# Patient Record
Sex: FEMALE | Race: BLACK OR AFRICAN AMERICAN | NOT HISPANIC OR LATINO | Employment: FULL TIME | URBAN - METROPOLITAN AREA
[De-identification: names, ages, dates, MRNs, and addresses within clinical notes are randomized per-mention and may not be internally consistent; named-entity substitution may affect disease eponyms.]

---

## 2017-06-24 ENCOUNTER — HOSPITAL ENCOUNTER (OUTPATIENT)
Facility: HOSPITAL | Age: 37
Setting detail: OBSERVATION
Discharge: HOME | End: 2017-06-25
Attending: EMERGENCY MEDICINE | Admitting: HOSPITALIST
Payer: COMMERCIAL

## 2017-06-24 ENCOUNTER — APPOINTMENT (EMERGENCY)
Dept: RADIOLOGY | Facility: HOSPITAL | Age: 37
End: 2017-06-24
Payer: COMMERCIAL

## 2017-06-24 DIAGNOSIS — E05.90 HYPERTHYROIDISM: Primary | ICD-10-CM

## 2017-06-24 DIAGNOSIS — R00.0 SINUS TACHYCARDIA: ICD-10-CM

## 2017-06-24 PROBLEM — N39.0 URINARY TRACT INFECTION: Status: ACTIVE | Noted: 2017-06-24

## 2017-06-24 PROBLEM — R79.89 ELEVATED LFTS: Status: ACTIVE | Noted: 2017-06-24

## 2017-06-24 LAB
ALBUMIN SERPL BCP-MCNC: 3.5 G/DL (ref 3.5–5)
ALP SERPL-CCNC: 91 U/L (ref 46–116)
ALT SERPL W P-5'-P-CCNC: 79 U/L (ref 12–78)
ANION GAP SERPL CALCULATED.3IONS-SCNC: 10 MMOL/L (ref 4–13)
AST SERPL W P-5'-P-CCNC: 60 U/L (ref 5–45)
ATRIAL RATE: 143 BPM
BACTERIA UR QL AUTO: ABNORMAL /HPF
BASOPHILS # BLD AUTO: 0.01 THOUSANDS/ΜL (ref 0–0.1)
BASOPHILS NFR BLD AUTO: 0 % (ref 0–1)
BILIRUB SERPL-MCNC: 1.37 MG/DL (ref 0.2–1)
BILIRUB UR QL STRIP: ABNORMAL
BUN SERPL-MCNC: 14 MG/DL (ref 5–25)
CALCIUM SERPL-MCNC: 9.9 MG/DL (ref 8.3–10.1)
CHLORIDE SERPL-SCNC: 106 MMOL/L (ref 100–108)
CLARITY UR: ABNORMAL
CLARITY, POC: NORMAL
CO2 SERPL-SCNC: 24 MMOL/L (ref 21–32)
COLOR UR: ABNORMAL
COLOR, POC: NORMAL
CREAT SERPL-MCNC: 0.56 MG/DL (ref 0.6–1.3)
DEPRECATED D DIMER PPP: 574 NG/ML (FEU) (ref 0–424)
EOSINOPHIL # BLD AUTO: 0.05 THOUSAND/ΜL (ref 0–0.61)
EOSINOPHIL NFR BLD AUTO: 1 % (ref 0–6)
ERYTHROCYTE [DISTWIDTH] IN BLOOD BY AUTOMATED COUNT: 12.3 % (ref 11.6–15.1)
EXT PROTEIN, UA: 30
GFR SERPL CREATININE-BSD FRML MDRD: >60 ML/MIN/1.73SQ M
GLUCOSE SERPL-MCNC: 116 MG/DL (ref 65–140)
GLUCOSE UR STRIP-MCNC: NEGATIVE MG/DL
HCG UR QL: NEGATIVE
HCT VFR BLD AUTO: 40.5 % (ref 34.8–46.1)
HGB BLD-MCNC: 13.3 G/DL (ref 11.5–15.4)
HGB UR QL STRIP.AUTO: NEGATIVE
HYALINE CASTS #/AREA URNS LPF: ABNORMAL /LPF
KETONES UR STRIP-MCNC: NEGATIVE MG/DL
LEUKOCYTE ESTERASE UR QL STRIP: NEGATIVE
LYMPHOCYTES # BLD AUTO: 1.85 THOUSANDS/ΜL (ref 0.6–4.47)
LYMPHOCYTES NFR BLD AUTO: 43 % (ref 14–44)
MCH RBC QN AUTO: 28.6 PG (ref 26.8–34.3)
MCHC RBC AUTO-ENTMCNC: 32.8 G/DL (ref 31.4–37.4)
MCV RBC AUTO: 87 FL (ref 82–98)
MONOCYTES # BLD AUTO: 0.45 THOUSAND/ΜL (ref 0.17–1.22)
MONOCYTES NFR BLD AUTO: 10 % (ref 4–12)
NEUTROPHILS # BLD AUTO: 1.97 THOUSANDS/ΜL (ref 1.85–7.62)
NEUTS SEG NFR BLD AUTO: 46 % (ref 43–75)
NITRITE UR QL STRIP: NEGATIVE
NON-SQ EPI CELLS URNS QL MICRO: ABNORMAL /HPF
NRBC BLD AUTO-RTO: 0 /100 WBCS
P AXIS: 81 DEGREES
PH UR STRIP.AUTO: 5.5 [PH] (ref 4.5–8)
PLATELET # BLD AUTO: 207 THOUSANDS/UL (ref 149–390)
PMV BLD AUTO: 10.7 FL (ref 8.9–12.7)
POTASSIUM SERPL-SCNC: 3.9 MMOL/L (ref 3.5–5.3)
PR INTERVAL: 148 MS
PROT SERPL-MCNC: 7.8 G/DL (ref 6.4–8.2)
PROT UR STRIP-MCNC: ABNORMAL MG/DL
QRS AXIS: 62 DEGREES
QRSD INTERVAL: 64 MS
QT INTERVAL: 268 MS
QTC INTERVAL: 413 MS
RBC # BLD AUTO: 4.65 MILLION/UL (ref 3.81–5.12)
RBC #/AREA URNS AUTO: ABNORMAL /HPF
SODIUM SERPL-SCNC: 140 MMOL/L (ref 136–145)
SP GR UR STRIP.AUTO: >=1.03 (ref 1–1.03)
SPECIMEN SOURCE: NORMAL
T WAVE AXIS: 61 DEGREES
T3FREE SERPL-MCNC: >30 PG/ML (ref 2.3–4.2)
T4 FREE SERPL-MCNC: >8 NG/DL (ref 0.76–1.46)
TROPONIN I BLD-MCNC: 0.01 NG/ML (ref 0–0.08)
TSH SERPL DL<=0.05 MIU/L-ACNC: <0.007 UIU/ML (ref 0.36–3.74)
UROBILINOGEN UR QL STRIP.AUTO: 1 E.U./DL
VENTRICULAR RATE: 143 BPM
WBC # BLD AUTO: 4.33 THOUSAND/UL (ref 4.31–10.16)
WBC #/AREA URNS AUTO: ABNORMAL /HPF

## 2017-06-24 PROCEDURE — 87086 URINE CULTURE/COLONY COUNT: CPT

## 2017-06-24 PROCEDURE — 96361 HYDRATE IV INFUSION ADD-ON: CPT

## 2017-06-24 PROCEDURE — 84439 ASSAY OF FREE THYROXINE: CPT | Performed by: EMERGENCY MEDICINE

## 2017-06-24 PROCEDURE — 80053 COMPREHEN METABOLIC PANEL: CPT | Performed by: EMERGENCY MEDICINE

## 2017-06-24 PROCEDURE — 84443 ASSAY THYROID STIM HORMONE: CPT | Performed by: EMERGENCY MEDICINE

## 2017-06-24 PROCEDURE — 93005 ELECTROCARDIOGRAM TRACING: CPT

## 2017-06-24 PROCEDURE — 80076 HEPATIC FUNCTION PANEL: CPT | Performed by: PHYSICIAN ASSISTANT

## 2017-06-24 PROCEDURE — 84484 ASSAY OF TROPONIN QUANT: CPT

## 2017-06-24 PROCEDURE — 85025 COMPLETE CBC W/AUTO DIFF WBC: CPT | Performed by: EMERGENCY MEDICINE

## 2017-06-24 PROCEDURE — 93005 ELECTROCARDIOGRAM TRACING: CPT | Performed by: EMERGENCY MEDICINE

## 2017-06-24 PROCEDURE — 81002 URINALYSIS NONAUTO W/O SCOPE: CPT | Performed by: EMERGENCY MEDICINE

## 2017-06-24 PROCEDURE — 36415 COLL VENOUS BLD VENIPUNCTURE: CPT | Performed by: EMERGENCY MEDICINE

## 2017-06-24 PROCEDURE — 85379 FIBRIN DEGRADATION QUANT: CPT | Performed by: EMERGENCY MEDICINE

## 2017-06-24 PROCEDURE — 96376 TX/PRO/DX INJ SAME DRUG ADON: CPT

## 2017-06-24 PROCEDURE — 99285 EMERGENCY DEPT VISIT HI MDM: CPT

## 2017-06-24 PROCEDURE — 81025 URINE PREGNANCY TEST: CPT | Performed by: EMERGENCY MEDICINE

## 2017-06-24 PROCEDURE — 81001 URINALYSIS AUTO W/SCOPE: CPT

## 2017-06-24 PROCEDURE — 71010 HB CHEST X-RAY 1 VIEW FRONTAL (PORTABLE): CPT

## 2017-06-24 PROCEDURE — 84481 FREE ASSAY (FT-3): CPT | Performed by: EMERGENCY MEDICINE

## 2017-06-24 PROCEDURE — 96374 THER/PROPH/DIAG INJ IV PUSH: CPT

## 2017-06-24 PROCEDURE — 87147 CULTURE TYPE IMMUNOLOGIC: CPT

## 2017-06-24 RX ORDER — ACETAMINOPHEN 325 MG/1
650 TABLET ORAL EVERY 6 HOURS PRN
Status: DISCONTINUED | OUTPATIENT
Start: 2017-06-24 | End: 2017-06-25 | Stop reason: HOSPADM

## 2017-06-24 RX ORDER — FERROUS SULFATE 325(65) MG
325 TABLET ORAL 2 TIMES DAILY
Status: DISCONTINUED | OUTPATIENT
Start: 2017-06-25 | End: 2017-06-25 | Stop reason: HOSPADM

## 2017-06-24 RX ORDER — METHIMAZOLE 10 MG/1
40 TABLET ORAL DAILY
Status: DISCONTINUED | OUTPATIENT
Start: 2017-06-24 | End: 2017-06-24 | Stop reason: SDUPTHER

## 2017-06-24 RX ORDER — DOCUSATE SODIUM 100 MG/1
100 CAPSULE, LIQUID FILLED ORAL 2 TIMES DAILY
Status: DISCONTINUED | OUTPATIENT
Start: 2017-06-25 | End: 2017-06-25 | Stop reason: HOSPADM

## 2017-06-24 RX ORDER — ADENOSINE 3 MG/ML
INJECTION, SOLUTION INTRAVENOUS
Status: DISPENSED
Start: 2017-06-24 | End: 2017-06-25

## 2017-06-24 RX ORDER — SENNOSIDES 8.6 MG
1 TABLET ORAL DAILY
Status: DISCONTINUED | OUTPATIENT
Start: 2017-06-25 | End: 2017-06-25 | Stop reason: HOSPADM

## 2017-06-24 RX ORDER — METHIMAZOLE 10 MG/1
40 TABLET ORAL EVERY 8 HOURS SCHEDULED
Status: DISCONTINUED | OUTPATIENT
Start: 2017-06-24 | End: 2017-06-24 | Stop reason: DRUGHIGH

## 2017-06-24 RX ORDER — PROPRANOLOL HYDROCHLORIDE 10 MG/1
10 TABLET ORAL EVERY 8 HOURS SCHEDULED
Status: DISCONTINUED | OUTPATIENT
Start: 2017-06-24 | End: 2017-06-25

## 2017-06-24 RX ORDER — ONDANSETRON 2 MG/ML
4 INJECTION INTRAMUSCULAR; INTRAVENOUS EVERY 6 HOURS PRN
Status: DISCONTINUED | OUTPATIENT
Start: 2017-06-24 | End: 2017-06-25 | Stop reason: HOSPADM

## 2017-06-24 RX ORDER — CALCIUM CARBONATE 500(1250)
1 TABLET ORAL 2 TIMES DAILY WITH MEALS
Status: DISCONTINUED | OUTPATIENT
Start: 2017-06-25 | End: 2017-06-25 | Stop reason: HOSPADM

## 2017-06-24 RX ORDER — METHIMAZOLE 10 MG/1
10 TABLET ORAL EVERY 8 HOURS SCHEDULED
Status: DISCONTINUED | OUTPATIENT
Start: 2017-06-24 | End: 2017-06-25 | Stop reason: DRUGHIGH

## 2017-06-24 RX ORDER — SODIUM CHLORIDE 9 MG/ML
100 INJECTION, SOLUTION INTRAVENOUS CONTINUOUS
Status: DISPENSED | OUTPATIENT
Start: 2017-06-24 | End: 2017-06-25

## 2017-06-24 RX ADMIN — SODIUM CHLORIDE 1000 ML: 0.9 INJECTION, SOLUTION INTRAVENOUS at 18:37

## 2017-06-24 RX ADMIN — SODIUM CHLORIDE 1000 ML: 0.9 INJECTION, SOLUTION INTRAVENOUS at 23:23

## 2017-06-24 RX ADMIN — SODIUM CHLORIDE 100 ML/HR: 0.9 INJECTION, SOLUTION INTRAVENOUS at 23:23

## 2017-06-24 RX ADMIN — CEFTRIAXONE 1000 MG: 1 INJECTION, POWDER, FOR SOLUTION INTRAMUSCULAR; INTRAVENOUS at 22:10

## 2017-06-24 RX ADMIN — METHIMAZOLE 40 MG: 10 TABLET ORAL at 21:37

## 2017-06-24 RX ADMIN — METOPROLOL TARTRATE 5 MG: 5 INJECTION INTRAVENOUS at 18:37

## 2017-06-24 RX ADMIN — METOPROLOL TARTRATE 5 MG: 5 INJECTION INTRAVENOUS at 19:14

## 2017-06-25 ENCOUNTER — APPOINTMENT (OUTPATIENT)
Dept: NON INVASIVE DIAGNOSTICS | Facility: HOSPITAL | Age: 37
End: 2017-06-25
Payer: COMMERCIAL

## 2017-06-25 ENCOUNTER — APPOINTMENT (OUTPATIENT)
Dept: RADIOLOGY | Facility: HOSPITAL | Age: 37
End: 2017-06-25
Payer: COMMERCIAL

## 2017-06-25 VITALS
WEIGHT: 152.12 LBS | HEIGHT: 60 IN | RESPIRATION RATE: 20 BRPM | SYSTOLIC BLOOD PRESSURE: 107 MMHG | TEMPERATURE: 98 F | DIASTOLIC BLOOD PRESSURE: 62 MMHG | OXYGEN SATURATION: 99 % | BODY MASS INDEX: 29.86 KG/M2 | HEART RATE: 64 BPM

## 2017-06-25 LAB
ALBUMIN SERPL BCP-MCNC: 2.6 G/DL (ref 3.5–5)
ALBUMIN SERPL BCP-MCNC: 3.6 G/DL (ref 3.5–5)
ALP SERPL-CCNC: 71 U/L (ref 46–116)
ALP SERPL-CCNC: 91 U/L (ref 46–116)
ALT SERPL W P-5'-P-CCNC: 85 U/L (ref 12–78)
ALT SERPL W P-5'-P-CCNC: 89 U/L (ref 12–78)
ANION GAP SERPL CALCULATED.3IONS-SCNC: 7 MMOL/L (ref 4–13)
AST SERPL W P-5'-P-CCNC: 64 U/L (ref 5–45)
AST SERPL W P-5'-P-CCNC: 64 U/L (ref 5–45)
BACTERIA UR CULT: NORMAL
BILIRUB DIRECT SERPL-MCNC: 0.35 MG/DL (ref 0–0.2)
BILIRUB DIRECT SERPL-MCNC: 0.38 MG/DL (ref 0–0.2)
BILIRUB SERPL-MCNC: 1.09 MG/DL (ref 0.2–1)
BILIRUB SERPL-MCNC: 1.42 MG/DL (ref 0.2–1)
BUN SERPL-MCNC: 9 MG/DL (ref 5–25)
CALCIUM SERPL-MCNC: 8.6 MG/DL (ref 8.3–10.1)
CHLORIDE SERPL-SCNC: 109 MMOL/L (ref 100–108)
CO2 SERPL-SCNC: 24 MMOL/L (ref 21–32)
CREAT SERPL-MCNC: 0.38 MG/DL (ref 0.6–1.3)
GFR SERPL CREATININE-BSD FRML MDRD: >60 ML/MIN/1.73SQ M
GLUCOSE SERPL-MCNC: 94 MG/DL (ref 65–140)
PLATELET # BLD AUTO: 161 THOUSANDS/UL (ref 149–390)
PMV BLD AUTO: 9.8 FL (ref 8.9–12.7)
POTASSIUM SERPL-SCNC: 4 MMOL/L (ref 3.5–5.3)
PROT SERPL-MCNC: 5.8 G/DL (ref 6.4–8.2)
PROT SERPL-MCNC: 7.6 G/DL (ref 6.4–8.2)
SODIUM SERPL-SCNC: 140 MMOL/L (ref 136–145)

## 2017-06-25 PROCEDURE — 84445 ASSAY OF TSI GLOBULIN: CPT | Performed by: PHYSICIAN ASSISTANT

## 2017-06-25 PROCEDURE — 80076 HEPATIC FUNCTION PANEL: CPT | Performed by: PHYSICIAN ASSISTANT

## 2017-06-25 PROCEDURE — 93306 TTE W/DOPPLER COMPLETE: CPT

## 2017-06-25 PROCEDURE — 85049 AUTOMATED PLATELET COUNT: CPT | Performed by: HOSPITALIST

## 2017-06-25 PROCEDURE — 76705 ECHO EXAM OF ABDOMEN: CPT

## 2017-06-25 PROCEDURE — 80048 BASIC METABOLIC PNL TOTAL CA: CPT | Performed by: HOSPITALIST

## 2017-06-25 RX ORDER — POTASSIUM IODIDE 1 G/ML
SOLUTION ORAL
Qty: 30 ML | Refills: 0 | Status: SHIPPED | OUTPATIENT
Start: 2017-06-25 | End: 2019-08-01 | Stop reason: HOSPADM

## 2017-06-25 RX ORDER — PROPRANOLOL HYDROCHLORIDE 10 MG/1
10 TABLET ORAL EVERY 8 HOURS SCHEDULED
Status: DISCONTINUED | OUTPATIENT
Start: 2017-06-25 | End: 2017-06-25

## 2017-06-25 RX ORDER — ATENOLOL 50 MG/1
50 TABLET ORAL ONCE
Status: COMPLETED | OUTPATIENT
Start: 2017-06-25 | End: 2017-06-25

## 2017-06-25 RX ORDER — METHIMAZOLE 10 MG/1
40 TABLET ORAL DAILY
Qty: 120 TABLET | Refills: 0 | Status: ON HOLD | OUTPATIENT
Start: 2017-06-25 | End: 2019-08-01 | Stop reason: SDUPTHER

## 2017-06-25 RX ORDER — TRAMADOL HYDROCHLORIDE 50 MG/1
50 TABLET ORAL EVERY 8 HOURS PRN
Status: DISCONTINUED | OUTPATIENT
Start: 2017-06-25 | End: 2017-06-25 | Stop reason: HOSPADM

## 2017-06-25 RX ORDER — METHIMAZOLE 10 MG/1
10 TABLET ORAL EVERY 8 HOURS SCHEDULED
Status: DISCONTINUED | OUTPATIENT
Start: 2017-06-25 | End: 2017-06-25 | Stop reason: HOSPADM

## 2017-06-25 RX ORDER — ATENOLOL 50 MG/1
50 TABLET ORAL 2 TIMES DAILY
Qty: 60 TABLET | Refills: 0 | Status: ON HOLD | OUTPATIENT
Start: 2017-06-25 | End: 2019-08-01 | Stop reason: SDUPTHER

## 2017-06-25 RX ADMIN — TRAMADOL HYDROCHLORIDE 50 MG: 50 TABLET, COATED ORAL at 02:43

## 2017-06-25 RX ADMIN — PROPRANOLOL HYDROCHLORIDE 10 MG: 10 TABLET ORAL at 00:17

## 2017-06-25 RX ADMIN — SENNOSIDES 8.6 MG: 8.6 TABLET, FILM COATED ORAL at 10:16

## 2017-06-25 RX ADMIN — Medication 1 TABLET: at 06:49

## 2017-06-25 RX ADMIN — Medication 325 MG: at 10:16

## 2017-06-25 RX ADMIN — ACETAMINOPHEN 650 MG: 325 TABLET, FILM COATED ORAL at 08:03

## 2017-06-25 RX ADMIN — PROPRANOLOL HYDROCHLORIDE 10 MG: 10 TABLET ORAL at 08:00

## 2017-06-25 RX ADMIN — ENOXAPARIN SODIUM 40 MG: 40 INJECTION SUBCUTANEOUS at 10:16

## 2017-06-25 RX ADMIN — METHIMAZOLE 10 MG: 10 TABLET ORAL at 06:49

## 2017-06-25 RX ADMIN — TRAMADOL HYDROCHLORIDE 50 MG: 50 TABLET, COATED ORAL at 15:37

## 2017-06-25 RX ADMIN — DOCUSATE SODIUM 100 MG: 100 CAPSULE, LIQUID FILLED ORAL at 10:16

## 2017-06-25 RX ADMIN — ATENOLOL 50 MG: 50 TABLET ORAL at 16:23

## 2017-07-05 LAB — TSI SER-ACNC: 568 % (ref 0–139)

## 2018-01-10 NOTE — MISCELLANEOUS
Message  telephone call from patient  C/O pain r/t hemorrhoid  States that tylenol is not working and wants something stronger  Pt  encouraged to try warm soaks, tucks, Preperation H  Also instructed to use stool softener      Active Problems    1  Encounter for supervision of other normal pregnancy in third trimester (V22 1) (Z34 83)   2  Hemorrhoids (455 6) (K64 9)   3  Pregnancy in third trimester with history of ectopic pregnancy (V23 42) (O09 13)    Current Meds   1  Colace 100 MG Oral Capsule; TAKE 1 CAPSULE TWICE DAILY AS NEEDED; Therapy: 52YSR0382 to [phone])  Requested for: 70QOJ0040; Last   Rx:[id number] Ordered   2  Ferrous Sulfate 325 (65 Fe) MG Oral Tablet; TAKE 1 TABLET TWICE DAILY WITH   MEALS; Therapy: 38RIQ8472 to Recorded   3  Prenatal Plus 27-1 MG Oral Tablet; Therapy: 30UIW9482 to Recorded   4  Vitamin C Plus 500 MG Oral Tablet; TAKE 1 TABLET DAILY; Therapy: 60TAH3915 to Recorded    Allergies    1  No Known Drug Allergies    2  No Known Environmental Allergies   3   No Known Food Allergies    Signatures   Electronically signed by : Nicolas Valdivia RN; Dec  7 2016 11:36AM EST                       (Author)

## 2018-01-14 NOTE — MISCELLANEOUS
Reason For Visit  Reason For Visit Free Text Note Form: PRE  ASSESSMENT  Case Management Documentation [facility]:   Information obtained from the patient and medical record  Patient's financial status employed  Patient is participating in [facility]  Action Plan: information provided  Progress Note  SW INTERN MET WITH 36YR OLD FEMALE, 4TH PREGNANCY, 37 WEEKS PREGNANT  AT HOME SHE HAS A 14, 12 AND 7YR OLD  PT IS , NO HX OF DV    NO HX OF D &A, MH OR SMOKING  PT QUIT SMOKING 7YRS AGO  PT IS PREPARED FOR BABY  PT WAS GIVEN PHONE NUMBER OF SCARLET, SHOULD SHE HAVE ADDITIONAL CONCERNS  WRITTEN BY EB [id number] [facility],[address]  Active Problems    1  Encounter for supervision of other normal pregnancy in third trimester (V22 1) (Z34 83)   2  Pregnancy in third trimester with history of ectopic pregnancy (V23 42) (O09 13)    Current Meds   1  Ferrous Sulfate 325 (65 Fe) MG Oral Tablet; TAKE 1 TABLET TWICE DAILY WITH MEALS; Therapy: [id number] to Recorded   2  Prenatal Plus 27-1 MG Oral Tablet; Therapy: 17QCK5316 to Recorded   3  Vitamin C Plus 500 MG Oral Tablet; TAKE 1 TABLET DAILY; Therapy: 76PXI6950 to Recorded    Allergies    1  No Known Drug Allergies    2  No Known Environmental Allergies   3   No Known Food Allergies    Plan    1  OB Global Urine Dip Non-Automated - POC; Status:Active - Perform Order; Requested   for:[id number];     Signatures   Electronically signed by : EB Abrams; 2016  4:03PM EST                       (Co-author)

## 2018-03-07 NOTE — PROGRESS NOTES
[employer] 3rd Trimester: The patient is planning on breastfeeding  The patient is planning on exclusively breastfeeding until the baby is 10months of age         Signatures   Electronically signed by : Adria Sorto RN; Nov 15 2016  5:32PM EST                       (Author)

## 2019-07-30 ENCOUNTER — APPOINTMENT (EMERGENCY)
Dept: RADIOLOGY | Facility: HOSPITAL | Age: 39
End: 2019-07-30
Payer: COMMERCIAL

## 2019-07-30 ENCOUNTER — HOSPITAL ENCOUNTER (OUTPATIENT)
Facility: HOSPITAL | Age: 39
Setting detail: OBSERVATION
Discharge: HOME | End: 2019-08-01
Attending: EMERGENCY MEDICINE | Admitting: INTERNAL MEDICINE
Payer: COMMERCIAL

## 2019-07-30 DIAGNOSIS — E05.90 HYPERTHYROIDISM: ICD-10-CM

## 2019-07-30 DIAGNOSIS — R00.0 SINUS TACHYCARDIA: ICD-10-CM

## 2019-07-30 DIAGNOSIS — E05.90 THYROTOXICOSIS: Primary | ICD-10-CM

## 2019-07-30 LAB
ALBUMIN SERPL BCP-MCNC: 3.6 G/DL (ref 3.5–5)
ALP SERPL-CCNC: 102 U/L (ref 46–116)
ALT SERPL W P-5'-P-CCNC: 51 U/L (ref 12–78)
ANION GAP SERPL CALCULATED.3IONS-SCNC: 11 MMOL/L (ref 4–13)
AST SERPL W P-5'-P-CCNC: 33 U/L (ref 5–45)
BASOPHILS # BLD AUTO: 0.02 THOUSANDS/ΜL (ref 0–0.1)
BASOPHILS NFR BLD AUTO: 0 % (ref 0–1)
BILIRUB SERPL-MCNC: 1.57 MG/DL (ref 0.2–1)
BUN SERPL-MCNC: 15 MG/DL (ref 5–25)
CALCIUM SERPL-MCNC: 9.4 MG/DL (ref 8.3–10.1)
CHLORIDE SERPL-SCNC: 105 MMOL/L (ref 100–108)
CO2 SERPL-SCNC: 22 MMOL/L (ref 21–32)
CREAT SERPL-MCNC: 0.72 MG/DL (ref 0.6–1.3)
EOSINOPHIL # BLD AUTO: 0.05 THOUSAND/ΜL (ref 0–0.61)
EOSINOPHIL NFR BLD AUTO: 1 % (ref 0–6)
ERYTHROCYTE [DISTWIDTH] IN BLOOD BY AUTOMATED COUNT: 12 % (ref 11.6–15.1)
GFR SERPL CREATININE-BSD FRML MDRD: 124 ML/MIN/1.73SQ M
GLUCOSE SERPL-MCNC: 105 MG/DL (ref 65–140)
HCT VFR BLD AUTO: 39.2 % (ref 34.8–46.1)
HGB BLD-MCNC: 12.3 G/DL (ref 11.5–15.4)
IMM GRANULOCYTES # BLD AUTO: 0.01 THOUSAND/UL (ref 0–0.2)
IMM GRANULOCYTES NFR BLD AUTO: 0 % (ref 0–2)
LYMPHOCYTES # BLD AUTO: 2.78 THOUSANDS/ΜL (ref 0.6–4.47)
LYMPHOCYTES NFR BLD AUTO: 43 % (ref 14–44)
MCH RBC QN AUTO: 27.8 PG (ref 26.8–34.3)
MCHC RBC AUTO-ENTMCNC: 31.4 G/DL (ref 31.4–37.4)
MCV RBC AUTO: 89 FL (ref 82–98)
MONOCYTES # BLD AUTO: 0.94 THOUSAND/ΜL (ref 0.17–1.22)
MONOCYTES NFR BLD AUTO: 14 % (ref 4–12)
NEUTROPHILS # BLD AUTO: 2.72 THOUSANDS/ΜL (ref 1.85–7.62)
NEUTS SEG NFR BLD AUTO: 42 % (ref 43–75)
NRBC BLD AUTO-RTO: 0 /100 WBCS
PLATELET # BLD AUTO: 228 THOUSANDS/UL (ref 149–390)
PMV BLD AUTO: 10.2 FL (ref 8.9–12.7)
POTASSIUM SERPL-SCNC: 3.8 MMOL/L (ref 3.5–5.3)
PROT SERPL-MCNC: 7.5 G/DL (ref 6.4–8.2)
RBC # BLD AUTO: 4.42 MILLION/UL (ref 3.81–5.12)
SODIUM SERPL-SCNC: 138 MMOL/L (ref 136–145)
TROPONIN I SERPL-MCNC: <0.02 NG/ML
TSH SERPL DL<=0.05 MIU/L-ACNC: <0.007 UIU/ML (ref 0.36–3.74)
WBC # BLD AUTO: 6.52 THOUSAND/UL (ref 4.31–10.16)

## 2019-07-30 PROCEDURE — 81025 URINE PREGNANCY TEST: CPT | Performed by: EMERGENCY MEDICINE

## 2019-07-30 PROCEDURE — 36415 COLL VENOUS BLD VENIPUNCTURE: CPT

## 2019-07-30 PROCEDURE — 80053 COMPREHEN METABOLIC PANEL: CPT | Performed by: EMERGENCY MEDICINE

## 2019-07-30 PROCEDURE — 81001 URINALYSIS AUTO W/SCOPE: CPT | Performed by: EMERGENCY MEDICINE

## 2019-07-30 PROCEDURE — 84484 ASSAY OF TROPONIN QUANT: CPT | Performed by: EMERGENCY MEDICINE

## 2019-07-30 PROCEDURE — 83880 ASSAY OF NATRIURETIC PEPTIDE: CPT

## 2019-07-30 PROCEDURE — 99284 EMERGENCY DEPT VISIT MOD MDM: CPT | Performed by: EMERGENCY MEDICINE

## 2019-07-30 PROCEDURE — 71046 X-RAY EXAM CHEST 2 VIEWS: CPT

## 2019-07-30 PROCEDURE — 84481 FREE ASSAY (FT-3): CPT | Performed by: INTERNAL MEDICINE

## 2019-07-30 PROCEDURE — 84443 ASSAY THYROID STIM HORMONE: CPT | Performed by: EMERGENCY MEDICINE

## 2019-07-30 PROCEDURE — 99285 EMERGENCY DEPT VISIT HI MDM: CPT

## 2019-07-30 PROCEDURE — 84439 ASSAY OF FREE THYROXINE: CPT

## 2019-07-30 PROCEDURE — 93005 ELECTROCARDIOGRAM TRACING: CPT

## 2019-07-30 PROCEDURE — 85025 COMPLETE CBC W/AUTO DIFF WBC: CPT | Performed by: EMERGENCY MEDICINE

## 2019-07-30 RX ORDER — METHIMAZOLE 10 MG/1
20 TABLET ORAL ONCE
Status: COMPLETED | OUTPATIENT
Start: 2019-07-30 | End: 2019-07-30

## 2019-07-30 RX ORDER — METHYLPREDNISOLONE SODIUM SUCCINATE 125 MG/2ML
125 INJECTION, POWDER, LYOPHILIZED, FOR SOLUTION INTRAMUSCULAR; INTRAVENOUS ONCE
Status: COMPLETED | OUTPATIENT
Start: 2019-07-30 | End: 2019-07-31

## 2019-07-30 RX ADMIN — METHIMAZOLE 20 MG: 10 TABLET ORAL at 23:59

## 2019-07-31 PROBLEM — E83.42 HYPOMAGNESEMIA: Status: ACTIVE | Noted: 2019-07-31

## 2019-07-31 PROBLEM — D72.819 LEUKOPENIA: Status: ACTIVE | Noted: 2019-07-31

## 2019-07-31 LAB
ANION GAP SERPL CALCULATED.3IONS-SCNC: 9 MMOL/L (ref 4–13)
ATRIAL RATE: 145 BPM
ATRIAL RATE: 150 BPM
ATRIAL RATE: 89 BPM
BACTERIA UR QL AUTO: ABNORMAL /HPF
BILIRUB UR QL STRIP: ABNORMAL
BUN SERPL-MCNC: 11 MG/DL (ref 5–25)
CALCIUM SERPL-MCNC: 8.3 MG/DL (ref 8.3–10.1)
CHLORIDE SERPL-SCNC: 109 MMOL/L (ref 100–108)
CLARITY UR: CLEAR
CO2 SERPL-SCNC: 19 MMOL/L (ref 21–32)
COLOR UR: ABNORMAL
CREAT SERPL-MCNC: 0.33 MG/DL (ref 0.6–1.3)
ERYTHROCYTE [DISTWIDTH] IN BLOOD BY AUTOMATED COUNT: 12 % (ref 11.6–15.1)
EXT PREG TEST URINE: NEGATIVE
EXT. CONTROL ED NAV: NORMAL
GFR SERPL CREATININE-BSD FRML MDRD: 164 ML/MIN/1.73SQ M
GLUCOSE SERPL-MCNC: 119 MG/DL (ref 65–140)
GLUCOSE UR STRIP-MCNC: NEGATIVE MG/DL
HCT VFR BLD AUTO: 31.8 % (ref 34.8–46.1)
HGB BLD-MCNC: 9.8 G/DL (ref 11.5–15.4)
HGB UR QL STRIP.AUTO: ABNORMAL
HYALINE CASTS #/AREA URNS LPF: ABNORMAL /LPF
KETONES UR STRIP-MCNC: ABNORMAL MG/DL
LEUKOCYTE ESTERASE UR QL STRIP: ABNORMAL
MAGNESIUM SERPL-MCNC: 1.5 MG/DL (ref 1.6–2.6)
MCH RBC QN AUTO: 27.7 PG (ref 26.8–34.3)
MCHC RBC AUTO-ENTMCNC: 30.8 G/DL (ref 31.4–37.4)
MCV RBC AUTO: 90 FL (ref 82–98)
NITRITE UR QL STRIP: NEGATIVE
NON-SQ EPI CELLS URNS QL MICRO: ABNORMAL /HPF
NT-PROBNP SERPL-MCNC: 119 PG/ML
P AXIS: 50 DEGREES
P AXIS: 76 DEGREES
P AXIS: 80 DEGREES
PH UR STRIP.AUTO: 5.5 [PH]
PLATELET # BLD AUTO: 165 THOUSANDS/UL (ref 149–390)
PLATELET # BLD AUTO: 176 THOUSANDS/UL (ref 149–390)
PMV BLD AUTO: 10.1 FL (ref 8.9–12.7)
PMV BLD AUTO: 10.5 FL (ref 8.9–12.7)
POTASSIUM SERPL-SCNC: 3.6 MMOL/L (ref 3.5–5.3)
PR INTERVAL: 128 MS
PR INTERVAL: 128 MS
PR INTERVAL: 168 MS
PROT UR STRIP-MCNC: NEGATIVE MG/DL
QRS AXIS: 36 DEGREES
QRS AXIS: 65 DEGREES
QRS AXIS: 69 DEGREES
QRSD INTERVAL: 70 MS
QRSD INTERVAL: 70 MS
QRSD INTERVAL: 80 MS
QT INTERVAL: 264 MS
QT INTERVAL: 274 MS
QT INTERVAL: 398 MS
QTC INTERVAL: 417 MS
QTC INTERVAL: 425 MS
QTC INTERVAL: 484 MS
RBC # BLD AUTO: 3.54 MILLION/UL (ref 3.81–5.12)
RBC #/AREA URNS AUTO: ABNORMAL /HPF
SODIUM SERPL-SCNC: 137 MMOL/L (ref 136–145)
SP GR UR STRIP.AUTO: 1.02 (ref 1–1.03)
T WAVE AXIS: 26 DEGREES
T WAVE AXIS: 50 DEGREES
T WAVE AXIS: 51 DEGREES
T3FREE SERPL-MCNC: 27.6 PG/ML (ref 2.3–4.2)
T4 FREE SERPL-MCNC: >8 NG/DL (ref 0.76–1.46)
UROBILINOGEN UR QL STRIP.AUTO: 1 E.U./DL
VENTRICULAR RATE: 145 BPM
VENTRICULAR RATE: 150 BPM
VENTRICULAR RATE: 89 BPM
WBC # BLD AUTO: 2.38 THOUSAND/UL (ref 4.31–10.16)
WBC #/AREA URNS AUTO: ABNORMAL /HPF

## 2019-07-31 PROCEDURE — 83735 ASSAY OF MAGNESIUM: CPT | Performed by: INTERNAL MEDICINE

## 2019-07-31 PROCEDURE — 80048 BASIC METABOLIC PNL TOTAL CA: CPT | Performed by: INTERNAL MEDICINE

## 2019-07-31 PROCEDURE — 93005 ELECTROCARDIOGRAM TRACING: CPT

## 2019-07-31 PROCEDURE — 99220 PR INITIAL OBSERVATION CARE/DAY 70 MINUTES: CPT | Performed by: INTERNAL MEDICINE

## 2019-07-31 PROCEDURE — 85027 COMPLETE CBC AUTOMATED: CPT | Performed by: INTERNAL MEDICINE

## 2019-07-31 PROCEDURE — 99244 OFF/OP CNSLTJ NEW/EST MOD 40: CPT | Performed by: INTERNAL MEDICINE

## 2019-07-31 PROCEDURE — 99225 PR SBSQ OBSERVATION CARE/DAY 25 MINUTES: CPT | Performed by: INTERNAL MEDICINE

## 2019-07-31 PROCEDURE — 85049 AUTOMATED PLATELET COUNT: CPT | Performed by: INTERNAL MEDICINE

## 2019-07-31 PROCEDURE — 93010 ELECTROCARDIOGRAM REPORT: CPT | Performed by: INTERNAL MEDICINE

## 2019-07-31 RX ORDER — POTASSIUM CHLORIDE 20 MEQ/1
20 TABLET, EXTENDED RELEASE ORAL ONCE
Status: DISCONTINUED | OUTPATIENT
Start: 2019-07-31 | End: 2019-07-31

## 2019-07-31 RX ORDER — METHIMAZOLE 10 MG/1
20 TABLET ORAL DAILY
Status: DISCONTINUED | OUTPATIENT
Start: 2019-07-31 | End: 2019-07-31

## 2019-07-31 RX ORDER — METHIMAZOLE 10 MG/1
40 TABLET ORAL DAILY
Status: DISCONTINUED | OUTPATIENT
Start: 2019-08-01 | End: 2019-08-01 | Stop reason: HOSPADM

## 2019-07-31 RX ORDER — ONDANSETRON 2 MG/ML
4 INJECTION INTRAMUSCULAR; INTRAVENOUS EVERY 6 HOURS PRN
Status: DISCONTINUED | OUTPATIENT
Start: 2019-07-31 | End: 2019-08-01 | Stop reason: HOSPADM

## 2019-07-31 RX ORDER — ATENOLOL 50 MG/1
50 TABLET ORAL 2 TIMES DAILY
Status: DISCONTINUED | OUTPATIENT
Start: 2019-07-31 | End: 2019-07-31

## 2019-07-31 RX ORDER — ACETAMINOPHEN 325 MG/1
650 TABLET ORAL EVERY 6 HOURS PRN
Status: DISCONTINUED | OUTPATIENT
Start: 2019-07-31 | End: 2019-08-01 | Stop reason: HOSPADM

## 2019-07-31 RX ORDER — MAGNESIUM SULFATE HEPTAHYDRATE 40 MG/ML
2 INJECTION, SOLUTION INTRAVENOUS ONCE
Status: COMPLETED | OUTPATIENT
Start: 2019-07-31 | End: 2019-07-31

## 2019-07-31 RX ORDER — POTASSIUM CHLORIDE 20 MEQ/1
40 TABLET, EXTENDED RELEASE ORAL ONCE
Status: COMPLETED | OUTPATIENT
Start: 2019-07-31 | End: 2019-07-31

## 2019-07-31 RX ORDER — SODIUM CHLORIDE, SODIUM GLUCONATE, SODIUM ACETATE, POTASSIUM CHLORIDE, MAGNESIUM CHLORIDE, SODIUM PHOSPHATE, DIBASIC, AND POTASSIUM PHOSPHATE .53; .5; .37; .037; .03; .012; .00082 G/100ML; G/100ML; G/100ML; G/100ML; G/100ML; G/100ML; G/100ML
125 INJECTION, SOLUTION INTRAVENOUS CONTINUOUS
Status: DISCONTINUED | OUTPATIENT
Start: 2019-07-31 | End: 2019-08-01 | Stop reason: HOSPADM

## 2019-07-31 RX ORDER — METOPROLOL TARTRATE 5 MG/5ML
5 INJECTION INTRAVENOUS EVERY 6 HOURS PRN
Status: DISCONTINUED | OUTPATIENT
Start: 2019-07-31 | End: 2019-08-01 | Stop reason: HOSPADM

## 2019-07-31 RX ORDER — SODIUM CHLORIDE, SODIUM LACTATE, POTASSIUM CHLORIDE, CALCIUM CHLORIDE 600; 310; 30; 20 MG/100ML; MG/100ML; MG/100ML; MG/100ML
125 INJECTION, SOLUTION INTRAVENOUS CONTINUOUS
Status: DISCONTINUED | OUTPATIENT
Start: 2019-07-31 | End: 2019-07-31

## 2019-07-31 RX ADMIN — PRENATAL VIT W/ FE FUMARATE-FA TAB 27-0.8 MG 1 TABLET: 27-0.8 TAB at 09:33

## 2019-07-31 RX ADMIN — SODIUM CHLORIDE, SODIUM LACTATE, POTASSIUM CHLORIDE, AND CALCIUM CHLORIDE 1000 ML: .6; .31; .03; .02 INJECTION, SOLUTION INTRAVENOUS at 00:23

## 2019-07-31 RX ADMIN — SODIUM CHLORIDE, SODIUM GLUCONATE, SODIUM ACETATE, POTASSIUM CHLORIDE, MAGNESIUM CHLORIDE, SODIUM PHOSPHATE, DIBASIC, AND POTASSIUM PHOSPHATE 125 ML/HR: .53; .5; .37; .037; .03; .012; .00082 INJECTION, SOLUTION INTRAVENOUS at 17:28

## 2019-07-31 RX ADMIN — MAGNESIUM SULFATE HEPTAHYDRATE 2 G: 40 INJECTION, SOLUTION INTRAVENOUS at 07:54

## 2019-07-31 RX ADMIN — ACETAMINOPHEN 650 MG: 325 TABLET ORAL at 14:29

## 2019-07-31 RX ADMIN — ATENOLOL 50 MG: 50 TABLET ORAL at 07:48

## 2019-07-31 RX ADMIN — METHIMAZOLE 20 MG: 10 TABLET ORAL at 07:48

## 2019-07-31 RX ADMIN — SODIUM CHLORIDE, SODIUM LACTATE, POTASSIUM CHLORIDE, AND CALCIUM CHLORIDE 125 ML/HR: .6; .31; .03; .02 INJECTION, SOLUTION INTRAVENOUS at 06:18

## 2019-07-31 RX ADMIN — SODIUM CHLORIDE, SODIUM GLUCONATE, SODIUM ACETATE, POTASSIUM CHLORIDE, MAGNESIUM CHLORIDE, SODIUM PHOSPHATE, DIBASIC, AND POTASSIUM PHOSPHATE 125 ML/HR: .53; .5; .37; .037; .03; .012; .00082 INJECTION, SOLUTION INTRAVENOUS at 07:58

## 2019-07-31 RX ADMIN — SODIUM CHLORIDE, SODIUM LACTATE, POTASSIUM CHLORIDE, AND CALCIUM CHLORIDE 125 ML/HR: .6; .31; .03; .02 INJECTION, SOLUTION INTRAVENOUS at 01:36

## 2019-07-31 RX ADMIN — POTASSIUM CHLORIDE 40 MEQ: 1500 TABLET, EXTENDED RELEASE ORAL at 07:48

## 2019-07-31 RX ADMIN — ATENOLOL 50 MG: 50 TABLET ORAL at 02:44

## 2019-07-31 RX ADMIN — METHYLPREDNISOLONE SODIUM SUCCINATE 125 MG: 125 INJECTION, POWDER, FOR SOLUTION INTRAMUSCULAR; INTRAVENOUS at 00:00

## 2019-07-31 RX ADMIN — ENOXAPARIN SODIUM 40 MG: 40 INJECTION SUBCUTANEOUS at 09:33

## 2019-07-31 NOTE — H&P
H&P- Yumiko Sierra 1980, 42 y o  female MRN: [id number]    Unit/Bed#: [facility] 401-01 Encounter: [id number]    Primary Care Provider: Madi Mcintosh MD   Date and time admitted to hospital: 7/30/2019 10:07 PM        * Hyperthyroidism  Assessment & Plan  · Known history of hyperthyroidism, has been off all medications for this since the end of last pregnancy  · TSH undetectable, a free T4 and free T3 are pending  · Kessler-Wartofsky score 35 at time of my admission  Remains tachycardic into 130s  · Will start atenolol at this time  · Received methimazole in ED and will continue  · Received solumedrol in ED  · Endocrinology consultation requested  · Telemetry monitoring  · SD2 overnight, likely can de-escalate level of care in AM if remains stable    Sinus tachycardia  Assessment & Plan  · In setting of uncontrolled severe hyperthyroidism  Otherwise is hemodynamically stable at this time  · Treatment as above for hyperthyroidism      VTE Prophylaxis: Enoxaparin (Lovenox)  / sequential compression device   Code Status: Level 1 - Full Code  POLST: POLST form is not discussed and not completed at this time  Anticipated Length of Stay:  Patient will be admitted on an Observation basis with an anticipated length of stay of  less than 2 midnights  Justification for Hospital Stay: Please see detailed plans noted above  Chief Complaint:     Rapid heart rate with history of hyperthyroidism  History of Present Illness:  Yumiko Sierra is a 42 y o  female who has a past medical history significant for hyperthyroidism secondary to Graves disease, previously managed on methimazole and atenolol  She was evaluated at her most recent pregnancy, delivery February 2019, for potential treatment however after discussion with Endocrinology and perinatology was term not treat as asymptomatic  Has not been evaluated by Endocrinology since her pregnancy not restarted on her thyroid medication    Stated that approximately 2 hours prior to presentation she suddenly became short of breath, found her heart rate on her watch to be approximately in the 170s  Found during initial ED evaluation to have undetectable TSH and sinus tachycardia into heart rate 130s and received methimazole and 1 dose Solu-Medrol  At the time of my evaluation, patient has no acute complaints, and states that she denies any fevers/chills, recent travel, known sick contacts, anxiety, chest pain/pressure, dizziness or lightheadedness, or current shortness of breath  Review of Systems:    Constitutional:  Denies fever or chills   Eyes:  Denies change in visual acuity   HENT:  Denies nasal congestion or sore throat   Respiratory:  Denies cough but previously endorsed shortness of breath  Cardiovascular:  Denies chest pain or edema but endorses rapid heart rate and palpitations  GI:  Denies abdominal pain, nausea, vomiting, bloody stools or diarrhea   :  Denies dysuria   Musculoskeletal:  Denies back pain or joint pain   Integument:  Denies rash   Neurologic:  Denies headache, focal weakness or sensory changes   Endocrine:  Denies polyuria or polydipsia   Lymphatic:  Denies swollen glands   Psychiatric:  Denies depression or anxiety     Past Medical and Surgical History:   Past Medical History:   Diagnosis Date    Anemia     Hemorrhoids      History reviewed  No pertinent surgical history      Meds/Allergies:  Medications Prior to Admission   Medication    Bioflavonoid Products (VITAMIN C PLUS) 500 MG TABS    calcium carbonate (OS-AKHIL) 600 MG tablet    PRENATAL MULTIVIT-MIN-FE-FA PO    Prenatal Vit-Fe Fumarate-FA (PRENATAL PLUS) 27-1 MG TABS    Prenatal Vit-Iron Carbonyl-FA (PRENATAL MULTIVITAMIN) TABS    atenolol (TENORMIN) 50 mg tablet    docusate sodium (COLACE) 100 mg capsule    ferrous sulfate 325 (65 Fe) mg tablet    ferrous sulfate 325 (65 Fe) mg tablet    medroxyPROGESTERone (DEPO-PROVERA) 150 mg/mL injection    methimazole (TAPAZOLE) 10 mg tablet    potassium iodide (SSKI) 1 g/mL solution       Allergies: No Known Allergies  History:  Marital Status: /Civil Union     Substance Use History:   Social History     Substance and Sexual Activity   Alcohol Use No     Social History     Tobacco Use   Smoking Status Never Smoker   Tobacco Comment    ALL SCRIPTS SAYS FORMER SMOKER     Social History     Substance and Sexual Activity   Drug Use No       Family History:  Family History   Problem Relation Age of Onset    No Known Problems Mother        Physical Exam:     Vitals:   Blood Pressure: 130/62(Map89) (07/31/19 0228)  Pulse: (!) 123 (07/31/19 0228)  Temperature: 99 °F (37 2 °C) (07/31/19 0228)  Temp Source: Oral (07/31/19 0228)  Respirations: (!) 23 (07/31/19 0228)  Height: 5' (152 4 cm) (07/31/19 0130)  Weight - Scale: 69 8 kg (153 lb 14 1 oz) (07/31/19 0130)  SpO2: 99 % (07/31/19 0228)    Constitutional:  Well developed, well nourished, no acute distress, non-toxic appearance   Eyes:  PERRL, conjunctiva normal   HENT:  Atraumatic, external ears normal, nose normal, oropharynx moist, no pharyngeal exudates  Neck- normal range of motion, no tenderness, supple   Respiratory:  No respiratory distress, normal breath sounds, no rales, no wheezing   Cardiovascular:  Tachycardic, normal rhythm, no murmurs, no gallops, no rubs   GI:  Soft, nondistended, normal bowel sounds, nontender, no organomegaly, no mass, no rebound, no guarding   :  No costovertebral angle tenderness   Musculoskeletal:  Trace pedal edema, no tenderness, no deformities  Back- no tenderness  Integument:  Well hydrated, no rash   Lymphatic:  No lymphadenopathy noted   Neurologic:  Alert &awake, communicative, CN 2-12 normal, normal motor function, normal sensory function, no focal deficits noted   Psychiatric:  Speech and behavior appropriate       Lab Results: I have personally reviewed pertinent reports        Results from last 7 days   Lab Units 07/31/19  0133 07/30/19  4739 WBC Thousand/uL  --  6 52   HEMOGLOBIN g/dL  --  12 3   HEMATOCRIT %  --  39 2   PLATELETS Thousands/uL 176 228   NEUTROS PCT %  --  42*   LYMPHS PCT %  --  43   MONOS PCT %  --  14*   EOS PCT %  --  1     Results from last 7 days   Lab Units 07/30/19  2213   POTASSIUM mmol/L 3 8   CHLORIDE mmol/L 105   CO2 mmol/L 22   BUN mg/dL 15   CREATININE mg/dL 0 72   CALCIUM mg/dL 9 4   ALK PHOS U/L 102   ALT U/L 51   AST U/L 33           EKG: Sinus tachycardia    Imaging: I have personally reviewed pertinent films in PACS    CXR: personally reviewed, no acute cardiopulmonary disease visualized  Final radiologist read is pending  ** Please Note: Dragon 360 Dictation voice to text software was used in the creation of this document   **

## 2019-07-31 NOTE — ASSESSMENT & PLAN NOTE
· Known history of hyperthyroidism, has been off all medications for this since the end of last pregnancy  · TSH undetectable, elevated free T4 and T3  · Restarted on atenolol and methimazole  · Received solumedrol in ED  · Continue IV fluid for hydration  · Awaiting endocrine evaluation

## 2019-07-31 NOTE — ED NOTES
Lactated ringers not available in emergency room    Called store room     [facility]  07/31/19 0704

## 2019-07-31 NOTE — NURSING NOTE
Patient c/o chest pain sternal region radiating to her back /66 map 95, , spo2 99% on room air, pain 8/10  EKG performed and Mile whitley notified and at bedside Ordered to give atenolol, methimazole early, 20 PO K and 2g magnesium   Will continue to monitor

## 2019-07-31 NOTE — ED PROVIDER NOTES
ASSESSMENT AND PLAN    Shfeali May is a 42 y o  female with a history of Graves disease, currently not on medication who presents for evaluation of tachycardia, dyspnea for the last 2 hours prior to arrival   The patient discontinued her methimazole over a year ago, and was not maintained on any thyroid medications during her pregnancy  On arrival, the patient is tachycardic, sinus tachycardia with a heart rate in the 170s, but otherwise hemodynamically stable and overall well-appearing without acute distress, with a nontoxic appearance   She has a low-grade fever 100 3 rectally  On exam , the patient does not display proptosis, tremor, and her mental status is overall normal   Her neurologic exam is unremarkable   The physical exam is otherwise unremarkable   -lab work is consistent with thyrotoxicosis  No other significant abnormalities  She does not meet criteria for thyroid storm  -urinalysis unremarkable  -chest x-ray unremarkable  BNP not elevated  -patient was given 1 L IV fluid, and on re-evaluation her heart rate improved to the 130s  For this reason, will given additional L of fluid, and defer beta-blockers for now   -will treat thyrotoxicosis with methimazole, IV Solu-Medrol  -plan for admission to the medicine team for further management of thyrotoxicosis  She will likely benefit from Endocrinology consultation either while admitted, or on an outpatient basis    History  Chief Complaint   Patient presents with    Rapid Heart Rate     Patient reports palpitations beginning approx 2 hours ago  Reports HR was 170s on watch  This is a 26-year-old female who presents for evaluation of tachycardia  She has a history of hyperthyroidism secondary to Graves disease, previously managed on methimazole and atenolol  Her initial thyroid storm episode was in 2017 which was thought to have been triggered due to a UTI, however she apparently discontinued taking therapy on her own    She was evaluated for possible restarting of medication during her last pregnancy, however after discussion with endocrinology and perinatology, it was determined to not treat as she was asymptomatic  She delivered her child in February/2019, and does not appear that she has been evaluated by Endocrinology since then, and she has not restarted her thyroid medication  Patient states that 2 hours ago, she felt well, and suddenly became short of breath  She checked her heart rate with her watch, and it was noted to be in the 170s, so she came to the emergency department for further evaluation  She denies any fevers, chills, chest pain, lightheadedness, dizziness, nausea, vomiting, or urinary symptoms  She does not have any upper respiratory symptoms, or signs of infection  She has no recent travel, recent surgeries, recent hospitalizations, recent sick contacts  Prior to Admission Medications   Prescriptions Last Dose Informant Patient Reported? Taking?    Bioflavonoid Products (VITAMIN C PLUS) 500 MG TABS   Yes No   Sig: Take 1 tablet by mouth daily   PRENATAL MULTIVIT-MIN-FE-FA PO   Yes No   Sig: Take 1 tablet by mouth   Prenatal Vit-Fe Fumarate-FA (PRENATAL PLUS) 27-1 MG TABS   Yes No   Sig: Take by mouth   Prenatal Vit-Iron Carbonyl-FA (PRENATAL MULTIVITAMIN) TABS  Self Yes No   Sig: Take 1 tablet by mouth daily   atenolol (TENORMIN) 50 mg tablet   No No   Sig: Take 1 tablet by mouth 2 (two) times a day   calcium carbonate (OS-AKHIL) 600 MG tablet  Self Yes No   Sig: Take 100 mg by mouth daily   docusate sodium (COLACE) 100 mg capsule   Yes No   Sig: Take 1 capsule by mouth 2 (two) times a day as needed   ferrous sulfate 325 (65 Fe) mg tablet  Self Yes No   Sig: Take 325 mg by mouth 2 (two) times a day   ferrous sulfate 325 (65 Fe) mg tablet   Yes No   Sig: Take 1 tablet by mouth Twice daily   medroxyPROGESTERone (DEPO-PROVERA) 150 mg/mL injection   Yes No   Sig: Inject 150 mg into the shoulder, thigh, or buttocks   methimazole (TAPAZOLE) 10 mg tablet   No No   Sig: Take 4 tablets by mouth daily   potassium iodide (SSKI) 1 g/mL solution   No No   Si drop BID x 3 days, then stop  Facility-Administered Medications: None       Past Medical History:   Diagnosis Date    Anemia     Hemorrhoids        History reviewed  No pertinent surgical history  Family History   Problem Relation Age of Onset    No Known Problems Mother      I have reviewed and agree with the history as documented  Social History     Tobacco Use    Smoking status: Never Smoker    Tobacco comment: ALL SCRIPTS SAYS FORMER SMOKER   Substance Use Topics    Alcohol use: No    Drug use: No        Review of Systems   Constitutional: Negative for chills and fever  HENT: Negative for congestion and rhinorrhea  Eyes: Negative for photophobia and visual disturbance  Respiratory: Positive for shortness of breath  Negative for cough  Cardiovascular: Positive for palpitations  Negative for chest pain  Gastrointestinal: Negative for abdominal pain, diarrhea, nausea and vomiting  Genitourinary: Negative for dysuria and frequency  Musculoskeletal: Negative for neck pain and neck stiffness  Skin: Negative for pallor and rash  Neurological: Negative for light-headedness and headaches  All other systems reviewed and are negative  Physical Exam  ED Triage Vitals [19 2209]   Temperature Pulse Respirations Blood Pressure SpO2   97 9 °F (36 6 °C) (!) 173 20 136/66 99 %      Temp Source Heart Rate Source Patient Position - Orthostatic VS BP Location FiO2 (%)   Oral Monitor Lying Right arm --      Pain Score       No Pain             Orthostatic Vital Signs  Vitals:    19 2245 19 2300 19 2315 19 0000   BP: 108/54 101/51 107/51 105/59   Pulse: (!) 132 (!) 136 (!) 136 (!) 130   Patient Position - Orthostatic VS: Lying Lying Lying Lying       Physical Exam   Constitutional: She is oriented to person, place, and time     Awake and alert, well appearing, no acute distress  HENT:   Head: Normocephalic and atraumatic  Mouth/Throat: Oropharynx is clear and moist  No oropharyngeal exudate  Eyes: Pupils are equal, round, and reactive to light  EOM are normal  Right eye exhibits no discharge  Left eye exhibits no discharge  No scleral icterus  Neck: Normal range of motion  Neck supple  No JVD present  No tracheal deviation present  Cardiovascular: Regular rhythm and normal heart sounds  No murmur heard  Tachycardic   Pulmonary/Chest: Effort normal  No stridor  No respiratory distress  She has no wheezes  She has no rales  Abdominal: Soft  She exhibits no distension and no mass  There is no tenderness  Musculoskeletal: Normal range of motion  She exhibits no edema or deformity  Neurological: She is alert and oriented to person, place, and time  No cranial nerve deficit  She exhibits normal muscle tone  Skin: Skin is warm and dry  No rash noted  No pallor         ED Medications  Medications   lactated ringers bolus 1,000 mL (1,000 mL Intravenous New Bag 7/31/19 0023)   methimazole (TAPAZOLE) tablet 20 mg (20 mg Oral Given 7/30/19 2359)   methylPREDNISolone sodium succinate (Solu-MEDROL) injection 125 mg (125 mg Intravenous Given 7/31/19 0000)       Diagnostic Studies  Results Reviewed     Procedure Component Value Units Date/Time    T4, free [[id number]]  (Abnormal) Collected:  07/30/19 2324    Lab Status:  Final result Specimen:  Blood from Arm, Left Updated:  07/31/19 0019     Free T4 >8 00 ng/dL     NT-BNP PRO (BNP for [place], [place], [place], [place], [place], [facility], [facility], [place] campuses) [[id number]]  (Normal) Collected:  07/30/19 2324    Lab Status:  Final result Specimen:  Blood from Arm, Left Updated:  07/31/19 0014     NT-proBNP 119 pg/mL     Urine Microscopic [[id number]]  (Abnormal) Collected:  07/30/19 2354    Lab Status:  Final result Specimen:  Urine, Clean Catch Updated:  07/31/19 0011     RBC, UA 4-10 /hpf      WBC, UA 2-4 /hpf      Epithelial Cells None Seen /hpf      Bacteria, UA None Seen /hpf      Hyaline Casts, UA 5-10 /lpf     UA w Reflex to Microscopic w Reflex to Culture [[id number]]  (Abnormal) Collected:  07/30/19 6643    Lab Status:  Final result Specimen:  Urine, Clean Catch Updated:  07/31/19 0009     Color, UA Dk Yellow     Clarity, UA Clear     Specific Gravity, UA 1 023     pH, UA 5 5     Leukocytes, UA Trace     Nitrite, UA Negative     Protein, UA Negative mg/dl      Glucose, UA Negative mg/dl      Ketones, UA 15 (1+) mg/dl      Urobilinogen, UA 1 0 E U /dl      Bilirubin, UA Interference- unable to analyze     Blood, UA Moderate    POCT pregnancy, urine [[id number]]  (Normal) Resulted:  07/31/19 0003    Lab Status:  Final result Updated:  07/31/19 0003     EXT PREG TEST UR (Ref: Negative) negative     Control valid    TSH [[id number]]  (Abnormal) Collected:  07/30/19 2213    Lab Status:  Final result Specimen:  Blood from Arm, Left Updated:  07/30/19 2259     TSH 3RD GENERATON <0 007 uIU/mL     Narrative:       Patients undergoing fluorescein dye angiography may retain small amounts of fluorescein in the body for 48-72 hours post procedure  Samples containing fluorescein can produce falsely depressed TSH values  If the patient had this procedure,a specimen should be resubmitted post fluorescein clearance        Comprehensive metabolic panel [[id number]]  (Abnormal) Collected:  07/30/19 2213    Lab Status:  Final result Specimen:  Blood from Arm, Left Updated:  07/30/19 2250     Sodium 138 mmol/L      Potassium 3 8 mmol/L      Chloride 105 mmol/L      CO2 22 mmol/L      ANION GAP 11 mmol/L      BUN 15 mg/dL      Creatinine 0 72 mg/dL      Glucose 105 mg/dL      Calcium 9 4 mg/dL      AST 33 U/L      ALT 51 U/L      Alkaline Phosphatase 102 U/L      Total Protein 7 5 g/dL      Albumin 3 6 g/dL      Total Bilirubin 1 57 mg/dL      eGFR 124 ml/min/1 73sq m     Narrative:       [place] guidelines for Chronic Kidney Disease (CKD):     Stage 1 with normal or high GFR (GFR > 90 mL/min/1 73 square meters)    Stage 2 Mild CKD (GFR = 60-89 mL/min/1 73 square meters)    Stage 3A Moderate CKD (GFR = 45-59 mL/min/1 73 square meters)    Stage 3B Moderate CKD (GFR = 30-44 mL/min/1 73 square meters)    Stage 4 Severe CKD (GFR = 15-29 mL/min/1 73 square meters)    Stage 5 End Stage CKD (GFR <15 mL/min/1 73 square meters)  Note: GFR calculation is accurate only with a steady state creatinine    Troponin I [[id number]]  (Normal) Collected:  07/30/19 2213    Lab Status:  Final result Specimen:  Blood from Arm, Left Updated:  07/30/19 2247     Troponin I <0 02 ng/mL     CBC and differential [[id number]]  (Abnormal) Collected:  07/30/19 2213    Lab Status:  Final result Specimen:  Blood from Arm, Left Updated:  07/30/19 2220     WBC 6 52 Thousand/uL      RBC 4 42 Million/uL      Hemoglobin 12 3 g/dL      Hematocrit 39 2 %      MCV 89 fL      MCH 27 8 pg      MCHC 31 4 g/dL      RDW 12 0 %      MPV 10 2 fL      Platelets 750 Thousands/uL      nRBC 0 /100 WBCs      Neutrophils Relative 42 %      Immat GRANS % 0 %      Lymphocytes Relative 43 %      Monocytes Relative 14 %      Eosinophils Relative 1 %      Basophils Relative 0 %      Neutrophils Absolute 2 72 Thousands/µL      Immature Grans Absolute 0 01 Thousand/uL      Lymphocytes Absolute 2 78 Thousands/µL      Monocytes Absolute 0 94 Thousand/µL      Eosinophils Absolute 0 05 Thousand/µL      Basophils Absolute 0 02 Thousands/µL                  XR chest 2 views    (Results Pending)         Procedures  ECG 12 Lead Documentation Only  Date/Time: 7/31/2019 1:04 AM  Performed by: Hanna Montanez MD  Authorized by: Hanna Montanez MD     ECG reviewed by me, the ED Provider: yes    Patient location:  ED  Previous ECG:     Previous ECG:  Compared to current    Similarity:  No change    Comparison to cardiac monitor: Yes    Interpretation:     Interpretation: abnormal    Comments:      Sinus tachycardia with a heart rate in the 170s  Normal axis, normal intervals  No ST/T-wave deviations  ED Course                               MDM    Disposition  Final diagnoses:   Thyrotoxicosis     Time reflects when diagnosis was documented in both MDM as applicable and the Disposition within this note     Time User Action Codes Description Comment    7/30/2019 11:44 PM Jenna Mark Add [E05 90] Thyrotoxicosis     7/31/2019 12:30 AM Soheila Hesrinivasan MERRITT Add [E05 90] Hyperthyroidism       ED Disposition     ED Disposition Condition Date/Time Comment    Admit Stable Tue Jul 30, 2019 11:44 PM Case was discussed with SHAUN and the patient's admission status was agreed to be Admission Status: observation status to the service of Dr John Garzon   Follow-up Information    None         Patient's Medications   Discharge Prescriptions    No medications on file     No discharge procedures on file  ED Provider  Attending physically available and evaluated Willard Baird I managed the patient along with the ED Attending      Electronically Signed by         Jo Klein MD  07/31/19 0464

## 2019-07-31 NOTE — NURSING NOTE
Pt c/o blurred vision, dizziness, and burning sensation in Right arm  Pt has IV in right arm with isolyte running - fluids were stopped and move to IV in left arm and pt reported relief in right arm burning sensation  Pt offers no c/o chest pain  Vital signs stable with BP at 122/65, heart rate 109, oxygen saturation 99% on room air  Neuro assessment within normal limits  Pupils round and reactive  Notified SLIM  Awaiting return page  1021: spoke with SHAUN (dr Lalitha Lehman) who stated to continue care  No new orders received  Will continue to monitor

## 2019-07-31 NOTE — ASSESSMENT & PLAN NOTE
· In setting of uncontrolled severe hyperthyroidism  Otherwise is hemodynamically stable at this time  · Continue atenolol  · Add p r n   Lopressor IV  · Continue to monitor

## 2019-07-31 NOTE — ASSESSMENT & PLAN NOTE
· Known history of hyperthyroidism, has been off all medications for this since the end of last pregnancy  · TSH undetectable, a free T4 and free T3 are pending  · Kessler-Wartofsky score 35 at time of my admission  Remains tachycardic into 130s  · Will start propranolol at this time     · Received methimazole in ED and will continue  · Received solumedrol in ED  · Endocrinology consultation requested  · Telemetry monitoring

## 2019-07-31 NOTE — CONSULTS
Consultation - Chelsea Estevez 42 y o  female MRN: [id number]    Unit/Bed#: [facility] 401-01 Encounter: [id number]      Assessment/Plan     Assessment/Plan:  Hyperthyroidism due to Graves disease: In the past, she did require methimazole 40 mg daily so will increase her dose given the degree of her elevation of thyroid hormone  She is still having some tachycardia so will increase atenolol to 75 mg bid  She will need repeat TSH, T3, free T4 in about 2 or 3 weeks as an outpatient  She has an endocrinologist that she follows up with at [facility] and I encouraged her to call in follow-up soon  Discussed that I would encouraged her to be evaluated by an ophthalmologist as well  Discussed with the patient about medical management of hyperthyroidism and use of antithyroid medications including the side effects of methimazole including joint aches, rash, agranulocytosis, and liver dysfunction  The patient should call if they develop side effects such as signs or symptoms of liver dysfunction (such as jaundice) or agranulocytosis (such as fever and sore throat)  Discussed the importance of compliance with medications to prevent life threatening complications of uncontrolled hyperthyroidism  Consults    CC:  Hyperthyroidism    History of Present Illness     HPI: Chelsea Estevez is a 42y o  year old female with history of hyperthyroidism due to Graves disease diagnosed about 2 years ago who presents to the hospital for shortness of breath and palpitations and tachycardia  She had a recent pregnancy with a delivery in February of this year  During pregnancy she states her levels looked okay and was not treated with any anti thyroid medication or beta-blocker  In the past, 2 years ago she was treated with methimazole and atenolol  She does report a grandparent had a history of hyperthyroidism as well  She does report some eye discomfort and blurred vision occasionally    I encouraged her to follow-up with an eye doctor as an outpatient in this regard  She reports since presenting to the hospital in being treated with methimazole in the ER along with Solu-Medrol in beta-blocker she feels much better  Review of Systems   Constitutional: Negative for appetite change  HENT: Negative for congestion and trouble swallowing  Eyes: Negative for visual disturbance  Respiratory: Positive for shortness of breath  Cardiovascular: Positive for palpitations  Negative for leg swelling  Gastrointestinal: Negative for abdominal pain, nausea and vomiting  Endocrine: Negative for polydipsia and polyuria  Genitourinary: Negative for difficulty urinating and frequency  Musculoskeletal: Negative for arthralgias  Skin: Negative for rash  Neurological: Negative for dizziness and weakness  Psychiatric/Behavioral: Negative for agitation and confusion  Historical Information   Past Medical History:   Diagnosis Date    Anemia     Hemorrhoids      History reviewed  No pertinent surgical history    Social History   Social History     Substance and Sexual Activity   Alcohol Use Not Currently     Social History     Substance and Sexual Activity   Drug Use No     Social History     Tobacco Use   Smoking Status Never Smoker   Tobacco Comment    ALL SCRIPTS SAYS FORMER SMOKER     Family History:   Family History   Problem Relation Age of Onset    No Known Problems Mother        Meds/Allergies   Current Facility-Administered Medications   Medication Dose Route Frequency Provider Last Rate Last Dose    acetaminophen (TYLENOL) tablet 650 mg  650 mg Oral Q6H PRN Kirt Muir MD        atenolol (TENORMIN) tablet 50 mg  50 mg Oral BID Kirt Muir MD   50 mg at 07/31/19 0748    enoxaparin (LOVENOX) subcutaneous injection 40 mg  40 mg Subcutaneous Daily Kirt Muir MD   40 mg at 07/31/19 0933    methimazole (TAPAZOLE) tablet 20 mg  20 mg Oral Daily Kirt Muir MD   20 mg at 07/31/19 0748    metoprolol (LOPRESSOR) injection 5 mg  5 mg Intravenous Q6H PRN Elridge Sida, DO        multi-electrolyte (ISOLYTE-S PH 7 4 equivalent) IV solution  125 mL/hr Intravenous Continuous Elridge Sida,  mL/hr at 07/31/19 0758 125 mL/hr at 07/31/19 0758    ondansetron (ZOFRAN) injection 4 mg  4 mg Intravenous Q6H PRN Sheree Asif MD        prenatal multivitamin tablet 1 tablet  1 tablet Oral Daily Sheree Asif MD   1 tablet at 07/31/19 0933     No Known Allergies    Objective   Vitals: Blood pressure 132/76, pulse (!) 110, temperature 98 1 °F (36 7 °C), temperature source Oral, resp  rate 18, height 5' (1 524 m), weight 69 8 kg (153 lb 14 1 oz), SpO2 99 %, currently breastfeeding  Intake/Output Summary (Last 24 hours) at 7/31/2019 1405  Last data filed at 7/31/2019 1239  Gross per 24 hour   Intake 2189 58 ml   Output 750 ml   Net 1439 58 ml     Invasive Devices     Peripheral Intravenous Line            Peripheral IV 07/31/19 Right Wrist less than 1 day                Physical Exam   Constitutional: She is oriented to person, place, and time  She appears well-developed and well-nourished  No distress  HENT:   Head: Normocephalic and atraumatic  Eyes:   No exophthalmos noted on exam   Neck: Normal range of motion  Neck supple  No thyromegaly present  Cardiovascular: Normal rate and regular rhythm  Pulmonary/Chest: Effort normal and breath sounds normal  No respiratory distress  Abdominal: Soft  Bowel sounds are normal    Musculoskeletal: She exhibits no edema  Neurological: She is alert and oriented to person, place, and time  Skin: Skin is warm and dry  No rash noted  She is not diaphoretic  Psychiatric: She has a normal mood and affect  Her behavior is normal        The history was obtained from the review of the chart, patient    Recent Results (from the past 24 hour(s))   ECG 12 lead    Collection Time: 07/30/19 10:12 PM   Result Value Ref Range    Ventricular Rate 150 BPM    Atrial Rate 150 BPM HI Interval 128 ms    QRSD Interval 70 ms    QT Interval 264 ms    QTC Interval 417 ms    P Axis 76 degrees    QRS Axis 69 degrees    T Wave Axis 50 degrees   CBC and differential    Collection Time: 07/30/19 10:13 PM   Result Value Ref Range    WBC 6 52 4 31 - 10 16 Thousand/uL    RBC 4 42 3 81 - 5 12 Million/uL    Hemoglobin 12 3 11 5 - 15 4 g/dL    Hematocrit 39 2 34 8 - 46 1 %    MCV 89 82 - 98 fL    MCH 27 8 26 8 - 34 3 pg    MCHC 31 4 31 4 - 37 4 g/dL    RDW 12 0 11 6 - 15 1 %    MPV 10 2 8 9 - 12 7 fL    Platelets 276 986 - 689 Thousands/uL    nRBC 0 /100 WBCs    Neutrophils Relative 42 (L) 43 - 75 %    Immat GRANS % 0 0 - 2 %    Lymphocytes Relative 43 14 - 44 %    Monocytes Relative 14 (H) 4 - 12 %    Eosinophils Relative 1 0 - 6 %    Basophils Relative 0 0 - 1 %    Neutrophils Absolute 2 72 1 85 - 7 62 Thousands/µL    Immature Grans Absolute 0 01 0 00 - 0 20 Thousand/uL    Lymphocytes Absolute 2 78 0 60 - 4 47 Thousands/µL    Monocytes Absolute 0 94 0 17 - 1 22 Thousand/µL    Eosinophils Absolute 0 05 0 00 - 0 61 Thousand/µL    Basophils Absolute 0 02 0 00 - 0 10 Thousands/µL   Comprehensive metabolic panel    Collection Time: 07/30/19 10:13 PM   Result Value Ref Range    Sodium 138 136 - 145 mmol/L    Potassium 3 8 3 5 - 5 3 mmol/L    Chloride 105 100 - 108 mmol/L    CO2 22 21 - 32 mmol/L    ANION GAP 11 4 - 13 mmol/L    BUN 15 5 - 25 mg/dL    Creatinine 0 72 0 60 - 1 30 mg/dL    Glucose 105 65 - 140 mg/dL    Calcium 9 4 8 3 - 10 1 mg/dL    AST 33 5 - 45 U/L    ALT 51 12 - 78 U/L    Alkaline Phosphatase 102 46 - 116 U/L    Total Protein 7 5 6 4 - 8 2 g/dL    Albumin 3 6 3 5 - 5 0 g/dL    Total Bilirubin 1 57 (H) 0 20 - 1 00 mg/dL    eGFR 124 ml/min/1 73sq m   Troponin I    Collection Time: 07/30/19 10:13 PM   Result Value Ref Range    Troponin I <0 02 <=0 04 ng/mL   TSH    Collection Time: 07/30/19 10:13 PM   Result Value Ref Range    TSH 3RD GENERATON <0 007 (L) 0 358 - 3 740 uIU/mL   ECG 12 lead Collection Time: 07/30/19 10:15 PM   Result Value Ref Range    Ventricular Rate 145 BPM    Atrial Rate 145 BPM    NH Interval 128 ms    QRSD Interval 70 ms    QT Interval 274 ms    QTC Interval 425 ms    P Axis 80 degrees    QRS Axis 65 degrees    T Wave Axis 51 degrees   T4, free    Collection Time: 07/30/19 11:24 PM   Result Value Ref Range    Free T4 >8 00 (HH) 0 76 - 1 46 ng/dL   NT-BNP PRO (BNP for [place], [place], [place], [place], [place], [facility], [facility], [place] campuses)    Collection Time: 07/30/19 11:24 PM   Result Value Ref Range    NT-proBNP 119 <125 pg/mL   T3, free    Collection Time: 07/30/19 11:24 PM   Result Value Ref Range    T3, Free 27 60 (H) 2 30 - 4 20 pg/mL   UA w Reflex to Microscopic w Reflex to Culture    Collection Time: 07/30/19 11:54 PM   Result Value Ref Range    Color, UA Dk Yellow     Clarity, UA Clear     Specific Gravity, UA 1 023 1 003 - 1 030    pH, UA 5 5 4 5, 5 0, 5 5, 6 0, 6 5, 7 0, 7 5, 8 0    Leukocytes, UA Trace (A) Negative    Nitrite, UA Negative Negative    Protein, UA Negative Negative mg/dl    Glucose, UA Negative Negative mg/dl    Ketones, UA 15 (1+) (A) Negative mg/dl    Urobilinogen, UA 1 0 0 2, 1 0 E U /dl E U /dl    Bilirubin, UA Interference- unable to analyze (A) Negative    Blood, UA Moderate (A) Negative   Urine Microscopic    Collection Time: 07/30/19 11:54 PM   Result Value Ref Range    RBC, UA 4-10 (A) None Seen, 0-5 /hpf    WBC, UA 2-4 (A) None Seen, 0-5, 5-55, 5-65 /hpf    Epithelial Cells None Seen None Seen, Occasional /hpf    Bacteria, UA None Seen None Seen, Occasional /hpf    Hyaline Casts, UA 5-10 (A) None Seen /lpf   POCT pregnancy, urine    Collection Time: 07/31/19 12:03 AM   Result Value Ref Range    EXT PREG TEST UR (Ref: Negative) negative     Control valid    Platelet count    Collection Time: 07/31/19  1:33 AM   Result Value Ref Range    Platelets 771 258 - 698 Thousands/uL    MPV 10 1 8 9 - 12 7 fL   Basic metabolic panel    Collection Time: 07/31/19  4:41 AM   Result Value Ref Range    Sodium 137 136 - 145 mmol/L    Potassium 3 6 3 5 - 5 3 mmol/L    Chloride 109 (H) 100 - 108 mmol/L    CO2 19 (L) 21 - 32 mmol/L    ANION GAP 9 4 - 13 mmol/L    BUN 11 5 - 25 mg/dL    Creatinine 0 33 (L) 0 60 - 1 30 mg/dL    Glucose 119 65 - 140 mg/dL    Calcium 8 3 8 3 - 10 1 mg/dL    eGFR 164 ml/min/1 73sq m   CBC (With Platelets)    Collection Time: 07/31/19  4:41 AM   Result Value Ref Range    WBC 2 38 (L) 4 31 - 10 16 Thousand/uL    RBC 3 54 (L) 3 81 - 5 12 Million/uL    Hemoglobin 9 8 (L) 11 5 - 15 4 g/dL    Hematocrit 31 8 (L) 34 8 - 46 1 %    MCV 90 82 - 98 fL    MCH 27 7 26 8 - 34 3 pg    MCHC 30 8 (L) 31 4 - 37 4 g/dL    RDW 12 0 11 6 - 15 1 %    Platelets 022 793 - 672 Thousands/uL    MPV 10 5 8 9 - 12 7 fL   Magnesium    Collection Time: 07/31/19  4:41 AM   Result Value Ref Range    Magnesium 1 5 (L) 1 6 - 2 6 mg/dL   ECG 12 lead    Collection Time: 07/31/19  6:05 AM   Result Value Ref Range    Ventricular Rate 89 BPM    Atrial Rate 89 BPM    [place] Interval 168 ms    QRSD Interval 80 ms    QT Interval 398 ms    QTC Interval 484 ms    P Axis 50 degrees    QRS Axis 36 degrees    T Wave Axis 26 degrees        Lab Results:       Lab Results   Component Value Date    WBC 2 38 (L) 07/31/2019    HGB 9 8 (L) 07/31/2019    HCT 31 8 (L) 07/31/2019    MCV 90 07/31/2019     07/31/2019     Lab Results   Component Value Date/Time    BUN 11 07/31/2019 04:41 AM    K 3 6 07/31/2019 04:41 AM     (H) 07/31/2019 04:41 AM    CO2 19 (L) 07/31/2019 04:41 AM    CREATININE 0 33 (L) 07/31/2019 04:41 AM    AST 33 07/30/2019 10:13 PM    ALT 51 07/30/2019 10:13 PM    ALB 3 6 07/30/2019 10:13 PM     No results for input(s): CHOL, HDL, LDL, TRIG, VLDL in the last 72 hours  No results found for: Manny Flores  No results found for: POCGLU    Imaging Studies: I have personally reviewed pertinent reports         Radiology Results (last 21 days)     Procedure Component Value Units Date/Time   XR chest 2 views [[id number]] Collected: 07/31/19 0838   Order Status: Completed Updated: 07/31/19 0839   Narrative:     CHEST     INDICATION:   Chest Pain  COMPARISON:  June 24, 2017    EXAM PERFORMED/VIEWS: [facility] CHEST PA & LATERAL      FINDINGS:    Cardiomediastinal silhouette appears unremarkable  The lungs are clear   No pneumothorax or pleural effusion  Osseous structures appear within normal limits for patient age  Impression:       No acute cardiopulmonary disease  Portions of the record may have been created with voice recognition software  Occasional wrong word or "sound a like" substitutions may have occurred due to the inherent limitations of voice recognition software  Read the chart carefully and recognize, using context, where substitutions have occurred

## 2019-07-31 NOTE — ED NOTES
Patient states that she does not feel the urge to urinate at this time       Faith Medrano RN  07/30/19 5573

## 2019-07-31 NOTE — UTILIZATION REVIEW
Initial Clinical Review    Admission: Date/Time/Statement: 07/30/2019 @ 2343  Orders Placed This Encounter   Procedures    Place in Observation (expected length of stay for this patient is less than two midnights)     Standing Status:   Standing     Number of Occurrences:   1     Order Specific Question:   Admitting Physician     Answer:   Shyla Estrada [[id number]]     Order Specific Question:   Level of Care     Answer:   Med Surg [[id number]]     ED Arrival Information     Expected Arrival Acuity Means of Arrival Escorted By Service Admission Type    - 7/30/2019 22:05 Emergent Walk-In Spouse Hospitalist Emergency    Arrival Complaint    rapid heart rate        Chief Complaint   Patient presents with    Rapid Heart Rate     Patient reports palpitations beginning approx 2 hours ago  Reports HR was 170s on watch  Assessment/Plan: 42year old female, presented to the ED from home via car  Admitted as Observation due to hyperthyroidism  Stated that approximately 2 hours prior to presentation she suddenly became short of breath, found her heart rate on her watch to be approximately in the 170s  Hyperthyroidism / Sinus tachycardia:  Known history of hyperthyroidism, has been off all medications for this since the end of last pregnancy  TSH undetectable, a free T4 and free T3 are pending  Kessler-Wartofsky score 35 at time of my admission  Remains tachycardic into 130s  Will start atenolol at this time  Received methimazole in ED and will continue  Received solumedrol in ED  Endocrinology consultation requested  Telemetry monitoring  07/31/2019  Change IV fluid to Isolyte given metabolic acidosis  Add Lopressor IV p r n  Continue tele  Will continue to require additional inpatient hospital stay due to Tachycardia      ED Triage Vitals [07/30/19 2209]   Temperature Pulse Respirations Blood Pressure SpO2   97 9 °F (36 6 °C) (!) 173 20 136/66 99 %      Temp Source Heart Rate Source Patient Position - Orthostatic VS BP Location FiO2 (%)   Oral Monitor Lying Right arm --      Pain Score       No Pain        Wt Readings from Last 1 Encounters:   07/31/19 69 8 kg (153 lb 14 1 oz)     Additional Vital Signs:   Date/Time  Temp  Pulse  Resp  BP  MAP (mmHg)  SpO2  O2 Device  Patient Position - Orthostatic VS   07/31/19 0748    111Abnormal     139/75           07/31/19 0228  99 °F (37 2 °C)  123Abnormal   18  130/62     99 %  None (Room air)  Lying   BP: Map89 at 07/31/19 0228   07/31/19 0200              None (Room air)     07/31/19 0130  98 8 °F (37 1 °C)  125Abnormal   25Abnormal   127/64  87  98 %  None (Room air)  Lying   07/31/19 0000    130Abnormal   25Abnormal   105/59    98 %  None (Room air)  Lying   07/30/19 2315    136Abnormal   24Abnormal   107/51    99 %  None (Room air)  Lying   07/30/19 2300    136Abnormal   22  101/51    99 %  None (Room air)  Lying   07/30/19 2245    132Abnormal   22  108/54    99 %  None (Room air)  Lying   07/30/19 2230    132Abnormal   20      98 %       07/30/19 2216  100 3 °F (37 9 °C)                 07/30/19 2215    152Abnormal   20  123/58    100 %       07/30/19 2209  97 9 °F (36 6 °C)  173Abnormal   20  136/66    99 %  None (Room air)  Lying       Pertinent Labs/Diagnostic Test Results:   Results from last 7 days   Lab Units 07/31/19  0441 07/31/19  0133 07/30/19  2213   WBC Thousand/uL 2 38*  --  6 52   HEMOGLOBIN g/dL 9 8*  --  12 3   HEMATOCRIT % 31 8*  --  39 2   PLATELETS Thousands/uL 165 176 228   NEUTROS ABS Thousands/µL  --   --  2 72     Results from last 7 days   Lab Units 07/31/19  0441 07/30/19  2213   SODIUM mmol/L 137 138   POTASSIUM mmol/L 3 6 3 8   CHLORIDE mmol/L 109* 105   CO2 mmol/L 19* 22   ANION GAP mmol/L 9 11   BUN mg/dL 11 15   CREATININE mg/dL 0 33* 0 72   EGFR ml/min/1 73sq m 164 124   CALCIUM mg/dL 8 3 9 4   MAGNESIUM mg/dL 1 5*  --      Results from last 7 days   Lab Units 07/30/19  2213   AST U/L 33   ALT U/L 51   ALK PHOS U/L 102   TOTAL PROTEIN g/dL 7 5   ALBUMIN g/dL 3 6   TOTAL BILIRUBIN mg/dL 1 57*     Results from last 7 days   Lab Units 19  0441 19  2213   GLUCOSE RANDOM mg/dL 119 105     Results from last 7 days   Lab Units 19  2213   TROPONIN I ng/mL <0 02     Results from last 7 days   Lab Units 19  2213   TSH 3RD GENERATON uIU/mL <0 007*     Results from last 7 days   Lab Units 19  2324   NT-PRO BNP pg/mL 119     Results from last 7 days   Lab Units 19  2354   CLARITY UA  Clear   COLOR UA  Dk Yellow   SPEC GRAV UA  1 023   PH UA  5 5   GLUCOSE UA mg/dl Negative   KETONES UA mg/dl 15 (1+)*   BLOOD UA  Moderate*   PROTEIN UA mg/dl Negative   NITRITE UA  Negative   BILIRUBIN UA  Interference- unable to analyze*   UROBILINOGEN UA E U /dl 1 0   LEUKOCYTES UA  Trace*   WBC UA /hpf 2-4*   RBC UA /hpf 4-10*   BACTERIA UA /hpf None Seen   EPITHELIAL CELLS WET PREP /hpf None Seen   2019 chest X:  Pending    2019 @ 2212  EC,   Sinus tachycardia  Possible Left atrial enlargement  RSR' or QR pattern in V1 suggests right ventricular conduction delay    ED Treatment:   Medication Administration from 2019 2205 to 2019 0117       Date/Time Order Dose Route Action Action by Comments     2019 2359 methimazole (TAPAZOLE) tablet 20 mg 20 mg Oral Given Jonathan Carrera RN      2019 0023 lactated ringers bolus 1,000 mL 1,000 mL Intravenous New Bag Jonathan Carrera RN Medication not avaiable     2019 0000 methylPREDNISolone sodium succinate (Solu-MEDROL) injection 125 mg 125 mg Intravenous Given Jonathan Carrera RN         Past Medical History:   Diagnosis Date    Anemia     Hemorrhoids      Present on Admission:   Hyperthyroidism   Sinus tachycardia    Admitting Diagnosis: Hyperthyroidism [E05 90]  Rapid heart rate [R00 0]  Thyrotoxicosis [E05 90]  Age/Sex: 42 y o  female  Admission Orders:  Current Facility-Administered Medications:  acetaminophen 650 mg Oral Q6H PRN   atenolol 50 mg Oral BID   enoxaparin 40 mg Subcutaneous Daily   lactated ringers 125 mL/hr Intravenous Continuous   magnesium sulfate 2 g Intravenous Once   methimazole 20 mg Oral Daily   ondansetron 4 mg Intravenous Q6H PRN   prenatal multivitamin 1 tablet Oral Daily   TELLILO Valladares SCDs  IP CONSULT TO ENDOCRINOLOGY    Network Utilization Review Department  Phone: [phone]; Fax [phone]  [email or web address]  ATTENTION: Please call with any questions or concerns to [phone]  and carefully listen to the prompts so that you are directed to the right person  Send all requests for admission clinical reviews, approved or denied determinations and any other requests to fax [phone]   All voicemails are confidential

## 2019-07-31 NOTE — PROGRESS NOTES
Progress Note - Brenda Right 1980, 42 y o  female MRN: [id number]    Unit/Bed#: [facility] 401-01 Encounter: [id number]    Primary Care Provider: Reji Camp MD   Date and time admitted to hospital: 7/30/2019 10:07 PM        * Hyperthyroidism  Assessment & Plan  · Known history of hyperthyroidism, has been off all medications for this since the end of last pregnancy  · TSH undetectable, elevated free T4 and T3  · Restarted on atenolol and methimazole  · Received solumedrol in ED  · Continue IV fluid for hydration  · Awaiting endocrine evaluation      Sinus tachycardia  Assessment & Plan  · In setting of uncontrolled severe hyperthyroidism  Otherwise is hemodynamically stable at this time  · Continue atenolol  · Add p r n  Lopressor IV  · Continue to monitor    Leukopenia  Assessment & Plan  Monitor    Hypomagnesemia  Assessment & Plan  Replace  Monitor   Change IV fluid to Isolyte given metabolic acidosis  Add Lopressor IV p r n  Continue tele    VTE Pharmacologic Prophylaxis:   Pharmacologic: Enoxaparin (Lovenox)  Mechanical VTE Prophylaxis in Place: Yes    Patient Centered Rounds: I have performed bedside rounds with nursing staff today  Discussions with Specialists or Other Care Team Provider:     Education and Discussions with Family / Patient:  Discussed with patient's  at bedside    Time Spent for Care: 30 minutes  More than 50% of total time spent on counseling and coordination of care as described above      Current Length of Stay: 0 day(s)    Current Patient Status: Observation   Certification Statement: The patient will continue to require additional inpatient hospital stay due to Tachycardia    Discharge Plan / Estimated Discharge Date: not ready yet    Code Status: Level 1 - Full Code      Subjective:   Patient seen and examined  Had chest pain earlier today  Burning sensation in the right arm  No shortness of breath  Still with palpitation with mild nausea  No vomiting no diarrhea    Objective:     Vitals:   Temp (24hrs), Av °F (37 2 °C), Min:97 9 °F (36 6 °C), Max:100 3 °F (37 9 °C)    Temp:  [97 9 °F (36 6 °C)-100 3 °F (37 9 °C)] 99 °F (37 2 °C)  HR:  [] 111  Resp:  [18-25] 18  BP: (101-139)/(51-75) 139/75  SpO2:  [98 %-100 %] 99 %  Body mass index is 30 05 kg/m²  Input and Output Summary (last 24 hours): Intake/Output Summary (Last 24 hours) at 2019 1055  Last data filed at 2019 7420  Gross per 24 hour   Intake 1589 58 ml   Output 100 ml   Net 1489 58 ml       Physical Exam:     Physical Exam  Patient is awake alert oriented no acute distress  Lung clear to auscultation bilateral  Heart with tachycardia no murmur  Abdomen soft nontender  Lower extremities no edema      Additional Data:     Labs:    Results from last 7 days   Lab Units 19  0441  19  2213   WBC Thousand/uL 2 38*  --  6 52   HEMOGLOBIN g/dL 9 8*  --  12 3   HEMATOCRIT % 31 8*  --  39 2   PLATELETS Thousands/uL 165   < > 228   NEUTROS PCT %  --   --  42*   LYMPHS PCT %  --   --  43   MONOS PCT %  --   --  14*   EOS PCT %  --   --  1    < > = values in this interval not displayed  Results from last 7 days   Lab Units 19  0441 19  2213   POTASSIUM mmol/L 3 6 3 8   CHLORIDE mmol/L 109* 105   CO2 mmol/L 19* 22   BUN mg/dL 11 15   CREATININE mg/dL 0 33* 0 72   CALCIUM mg/dL 8 3 9 4   ALK PHOS U/L  --  102   ALT U/L  --  51   AST U/L  --  33           * I Have Reviewed All Lab Data Listed Above  * Additional Pertinent Lab Tests Reviewed:  Kari 66 Admission Reviewed    Imaging:    Imaging Reports Reviewed Today Include:   Imaging Personally Reviewed by Myself Includes:     Recent Cultures (last 7 days):           Last 24 Hours Medication List:     Current Facility-Administered Medications:  acetaminophen 650 mg Oral Q6H PRN Dulce Méndez MD    atenolol 50 mg Oral BID Dulce Méndez MD    enoxaparin 40 mg Subcutaneous Daily Dulce Méndez MD methimazole 20 mg Oral Daily Jesus Cifuentes MD    metoprolol 5 mg Intravenous Q6H PRN Rajat Wilson DO    multi-electrolyte 125 mL/hr Intravenous Continuous Rajat Wilson DO Last Rate: 125 mL/hr (07/31/19 0758)   ondansetron 4 mg Intravenous Q6H PRN Jesus Cifuentes MD    prenatal multivitamin 1 tablet Oral Daily Jesus Cifuentes MD         Today, Patient Was Seen By: Rajat Wilson DO    ** Please Note: This note has been constructed using a voice recognition system   **

## 2019-07-31 NOTE — ASSESSMENT & PLAN NOTE
· In setting of uncontrolled severe hyperthyroidism    Otherwise is hemodynamically stable at this time  · Treatment as above for hyperthyroidism

## 2019-07-31 NOTE — PLAN OF CARE
Problem: Potential for Falls  Goal: Patient will remain free of falls  Description  INTERVENTIONS:  - Assess patient frequently for physical needs  -  Identify cognitive and physical deficits and behaviors that affect risk of falls  -  [place] fall precautions as indicated by assessment   - Educate patient/family on patient safety including physical limitations  - Instruct patient to call for assistance with activity based on assessment  - Modify environment to reduce risk of injury  - Consider OT/PT consult to assist with strengthening/mobility  Outcome: Progressing     Problem: CARDIOVASCULAR - ADULT  Goal: Maintains optimal cardiac output and hemodynamic stability  Description  INTERVENTIONS:  - Monitor I/O, vital signs and rhythm  - Monitor for S/S and trends of decreased cardiac output i e  bleeding, hypotension  - Administer and titrate ordered vasoactive medications to optimize hemodynamic stability  - Assess quality of pulses, skin color and temperature  - Assess for signs of decreased coronary artery perfusion - ex   Angina  - Instruct patient to report change in severity of symptoms  Outcome: Progressing  Goal: Absence of cardiac dysrhythmias or at baseline rhythm  Description  INTERVENTIONS:  - Continuous cardiac monitoring, monitor vital signs, obtain 12 lead EKG if indicated  - Administer antiarrhythmic and heart rate control medications as ordered  - Monitor electrolytes and administer replacement therapy as ordered  Outcome: Progressing

## 2019-07-31 NOTE — SOCIAL WORK
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  He is Shen Chavez, phone [phone]  They reside in [place] with their children  They live in one story home with 5 TAYLOR  She uses no dme, no hx hhc or rehab, no hx mental illness or D&A, no POA  Her PCP is Dr Mary Baugh and [facility] uses [facility]  She would like [facility] meds to beds at discharge   will transport home at discharge  No HHC needs  CM reviewed d/c planning process including the following: identifying help at home, patient preference for d/c planning needs, Discharge Lounge, [facility] to Bed program, availability of treatment team to discuss questions or concerns patient and/or family may have regarding understanding medications and recognizing signs and symptoms once discharged  CM also encouraged patient to follow up with all recommended appointments after discharge  Patient advised of importance for patient and family to participate in managing patients medical well being  Patient/caregiver received discharge checklist  Content reviewed  Patient/caregiver encouraged to participate in discharge plan of care prior to discharge home

## 2019-08-01 VITALS
HEIGHT: 60 IN | TEMPERATURE: 98.1 F | DIASTOLIC BLOOD PRESSURE: 83 MMHG | SYSTOLIC BLOOD PRESSURE: 119 MMHG | OXYGEN SATURATION: 99 % | WEIGHT: 153.88 LBS | BODY MASS INDEX: 30.21 KG/M2 | RESPIRATION RATE: 20 BRPM | HEART RATE: 109 BPM

## 2019-08-01 PROBLEM — E83.42 HYPOMAGNESEMIA: Status: RESOLVED | Noted: 2019-07-31 | Resolved: 2019-08-01

## 2019-08-01 PROBLEM — D72.819 LEUKOPENIA: Status: RESOLVED | Noted: 2019-07-31 | Resolved: 2019-08-01

## 2019-08-01 LAB
ANION GAP SERPL CALCULATED.3IONS-SCNC: 9 MMOL/L (ref 4–13)
BASOPHILS # BLD AUTO: 0.01 THOUSANDS/ΜL (ref 0–0.1)
BASOPHILS NFR BLD AUTO: 0 % (ref 0–1)
BUN SERPL-MCNC: 10 MG/DL (ref 5–25)
CALCIUM SERPL-MCNC: 8.5 MG/DL (ref 8.3–10.1)
CHLORIDE SERPL-SCNC: 114 MMOL/L (ref 100–108)
CO2 SERPL-SCNC: 23 MMOL/L (ref 21–32)
CREAT SERPL-MCNC: 0.39 MG/DL (ref 0.6–1.3)
EOSINOPHIL # BLD AUTO: 0.03 THOUSAND/ΜL (ref 0–0.61)
EOSINOPHIL NFR BLD AUTO: 1 % (ref 0–6)
ERYTHROCYTE [DISTWIDTH] IN BLOOD BY AUTOMATED COUNT: 12.2 % (ref 11.6–15.1)
GFR SERPL CREATININE-BSD FRML MDRD: 155 ML/MIN/1.73SQ M
GLUCOSE SERPL-MCNC: 108 MG/DL (ref 65–140)
HCT VFR BLD AUTO: 33.4 % (ref 34.8–46.1)
HGB BLD-MCNC: 10.4 G/DL (ref 11.5–15.4)
IMM GRANULOCYTES # BLD AUTO: 0.01 THOUSAND/UL (ref 0–0.2)
IMM GRANULOCYTES NFR BLD AUTO: 0 % (ref 0–2)
LYMPHOCYTES # BLD AUTO: 2.36 THOUSANDS/ΜL (ref 0.6–4.47)
LYMPHOCYTES NFR BLD AUTO: 37 % (ref 14–44)
MAGNESIUM SERPL-MCNC: 2.3 MG/DL (ref 1.6–2.6)
MCH RBC QN AUTO: 28 PG (ref 26.8–34.3)
MCHC RBC AUTO-ENTMCNC: 31.1 G/DL (ref 31.4–37.4)
MCV RBC AUTO: 90 FL (ref 82–98)
MONOCYTES # BLD AUTO: 0.69 THOUSAND/ΜL (ref 0.17–1.22)
MONOCYTES NFR BLD AUTO: 11 % (ref 4–12)
NEUTROPHILS # BLD AUTO: 3.22 THOUSANDS/ΜL (ref 1.85–7.62)
NEUTS SEG NFR BLD AUTO: 51 % (ref 43–75)
NRBC BLD AUTO-RTO: 0 /100 WBCS
PHOSPHATE SERPL-MCNC: 4.1 MG/DL (ref 2.7–4.5)
PLATELET # BLD AUTO: 176 THOUSANDS/UL (ref 149–390)
PMV BLD AUTO: 10.9 FL (ref 8.9–12.7)
POTASSIUM SERPL-SCNC: 3.8 MMOL/L (ref 3.5–5.3)
RBC # BLD AUTO: 3.71 MILLION/UL (ref 3.81–5.12)
SODIUM SERPL-SCNC: 146 MMOL/L (ref 136–145)
WBC # BLD AUTO: 6.32 THOUSAND/UL (ref 4.31–10.16)

## 2019-08-01 PROCEDURE — 80048 BASIC METABOLIC PNL TOTAL CA: CPT | Performed by: INTERNAL MEDICINE

## 2019-08-01 PROCEDURE — 85025 COMPLETE CBC W/AUTO DIFF WBC: CPT | Performed by: INTERNAL MEDICINE

## 2019-08-01 PROCEDURE — 84100 ASSAY OF PHOSPHORUS: CPT | Performed by: INTERNAL MEDICINE

## 2019-08-01 PROCEDURE — 83735 ASSAY OF MAGNESIUM: CPT | Performed by: INTERNAL MEDICINE

## 2019-08-01 PROCEDURE — 99217 PR OBSERVATION CARE DISCHARGE MANAGEMENT: CPT | Performed by: FAMILY MEDICINE

## 2019-08-01 RX ORDER — ATENOLOL 50 MG/1
75 TABLET ORAL 2 TIMES DAILY
Qty: 60 TABLET | Refills: 0 | Status: SHIPPED | OUTPATIENT
Start: 2019-08-01

## 2019-08-01 RX ORDER — METHIMAZOLE 10 MG/1
40 TABLET ORAL DAILY
Qty: 120 TABLET | Refills: 0 | Status: SHIPPED | OUTPATIENT
Start: 2019-08-01

## 2019-08-01 RX ADMIN — METHIMAZOLE 40 MG: 10 TABLET ORAL at 08:25

## 2019-08-01 RX ADMIN — ENOXAPARIN SODIUM 40 MG: 40 INJECTION SUBCUTANEOUS at 08:25

## 2019-08-01 RX ADMIN — ATENOLOL 75 MG: 25 TABLET ORAL at 08:28

## 2019-08-01 RX ADMIN — PRENATAL VIT W/ FE FUMARATE-FA TAB 27-0.8 MG 1 TABLET: 27-0.8 TAB at 08:25

## 2019-08-01 RX ADMIN — SODIUM CHLORIDE, SODIUM GLUCONATE, SODIUM ACETATE, POTASSIUM CHLORIDE, MAGNESIUM CHLORIDE, SODIUM PHOSPHATE, DIBASIC, AND POTASSIUM PHOSPHATE 125 ML/HR: .53; .5; .37; .037; .03; .012; .00082 INJECTION, SOLUTION INTRAVENOUS at 04:16

## 2019-08-01 NOTE — DISCHARGE INSTRUCTIONS
Obtain thyroid function studies in 2 weeks, contact your endocrinologist from [address] for blood work  Please continue to take the medication as prescribed  Avoid strenuous activity or exercise until cleared by your family doctor or your Endocrinologist

## 2019-08-01 NOTE — UTILIZATION REVIEW
Continued Stay Review    Date: 08/01/2019                          Current Patient Class: OBSERVATION  Current Level of Care: Med/surg    HPI:39 y o  female initially admitted on 07/30/2019     Assessment/Plan: DC order entered    07/31/2019  Consult Endocrine: Hyperthyroidism due to Graves disease  tachycardia so will increase atenolol to 75 mg bid  Methimazole 40 mg daily so will increase her dose given the degree of her elevation of thyroid hormone  She will need repeat TSH, T3, free T4 in about 2 or 3 weeks as an outpatient  She has an endocrinologist that she follows up with at [facility]         Pertinent Labs/Diagnostic Results:   Results from last 7 days   Lab Units 08/01/19 0522 07/31/19 0441 07/31/19  0133 07/30/19  2213   WBC Thousand/uL 6 32 2 38*  --  6 52   HEMOGLOBIN g/dL 10 4* 9 8*  --  12 3   HEMATOCRIT % 33 4* 31 8*  --  39 2   PLATELETS Thousands/uL 176 165 176 228   NEUTROS ABS Thousands/µL 3 22  --   --  2 72     Results from last 7 days   Lab Units 08/01/19 0522 07/31/19 0441 07/30/19  2213   SODIUM mmol/L 146* 137 138   POTASSIUM mmol/L 3 8 3 6 3 8   CHLORIDE mmol/L 114* 109* 105   CO2 mmol/L 23 19* 22   ANION GAP mmol/L 9 9 11   BUN mg/dL 10 11 15   CREATININE mg/dL 0 39* 0 33* 0 72   EGFR ml/min/1 73sq m 155 164 124   CALCIUM mg/dL 8 5 8 3 9 4   MAGNESIUM mg/dL 2 3 1 5*  --    PHOSPHORUS mg/dL 4 1  --   --      Results from last 7 days   Lab Units 07/30/19  2213   AST U/L 33   ALT U/L 51   ALK PHOS U/L 102   TOTAL PROTEIN g/dL 7 5   ALBUMIN g/dL 3 6   TOTAL BILIRUBIN mg/dL 1 57*     Results from last 7 days   Lab Units 08/01/19 0522 07/31/19 0441 07/30/19  2213   GLUCOSE RANDOM mg/dL 108 119 105     Results from last 7 days   Lab Units 07/30/19  2213   TROPONIN I ng/mL <0 02     Results from last 7 days   Lab Units 07/30/19  2213   TSH 3RD GENERATON uIU/mL <0 007*     Results from last 7 days   Lab Units 07/30/19  2324   NT-PRO BNP pg/mL 119     Results from last 7 days   Lab Units 07/30/19  2354   CLARITY UA  Clear   COLOR UA  Dk Yellow   SPEC GRAV UA  1 023   PH UA  5 5   GLUCOSE UA mg/dl Negative   KETONES UA mg/dl 15 (1+)*   BLOOD UA  Moderate*   PROTEIN UA mg/dl Negative   NITRITE UA  Negative   BILIRUBIN UA  Interference- unable to analyze*   UROBILINOGEN UA E U /dl 1 0   LEUKOCYTES UA  Trace*   WBC UA /hpf 2-4*   RBC UA /hpf 4-10*   BACTERIA UA /hpf None Seen   EPITHELIAL CELLS WET PREP /hpf None Seen     Vital Signs:   Date/Time  Temp  Pulse  Resp  BP  MAP (mmHg)  SpO2  O2 Device  Patient Position - Orthostatic VS   08/01/19 0828    109Abnormal     119/83           07/31/19 2215  98 1 °F (36 7 °C)  113Abnormal   20  106/64    99 %    Lying   07/31/19 2040        115/60        Lying   07/31/19 2015  98 2 °F (36 8 °C)  105  20  98/62    99 %  None (Room air)  Sitting         Medications:   Scheduled Meds:   Current Facility-Administered Medications:  acetaminophen 650 mg Oral Q6H PRN   atenolol 75 mg Oral BID   enoxaparin 40 mg Subcutaneous Daily   methimazole 40 mg Oral Daily   metoprolol 5 mg Intravenous Q6H PRN   multi-electrolyte 125 mL/hr Intravenous Continuous   ondansetron 4 mg Intravenous Q6H PRN   prenatal multivitamin 1 tablet Oral Daily       Discharge Plan: Home    Network Utilization Review Department  Phone: [phone]; Fax [phone]  [email or web address]  ATTENTION: Please call with any questions or concerns to [phone]  and carefully listen to the prompts so that you are directed to the right person  Send all requests for admission clinical reviews, approved or denied determinations and any other requests to fax [phone]   All voicemails are confidential

## 2019-08-01 NOTE — DISCHARGE SUMMARY
Discharge Summary - [facility] Internal Medicine    Patient Information: Lacie Borden 42 y o  female MRN: [id number]  [facility] Encounter: [id number]    Discharging Physician / Practitioner: Mihai Landeros MD  PCP: Eli Burger MD  Admission Date: 7/30/2019  Discharge Date: 08/01/19    Disposition:     Home    Reason for Admission:  Hypothyroidism    Discharge Diagnoses:     Principal Problem:    Hyperthyroidism  Active Problems:    Sinus tachycardia  Resolved Problems:    Hypomagnesemia    Leukopenia      Consultations During Hospital Stay:  · Endocrinology    Procedures Performed:     ·     Significant Findings / Test Results:     Chest x-ray-no active cardio pulmonary disease  T3-27 6  T4-above 8  TSH  Less than 0 007  Incidental Findings:   ·     Test Results Pending at Discharge (will require follow up):   ·      Outpatient Tests Requested:  · Thyroid function studies in 2 weeks    Complications:   none    Hospital Course:     Lacie Borden is a 42 y o  female patient who originally presented to the hospital on 7/30/2019 due to shortness of breath and palpitation  Patient with known history of hyperthyroid who was on methimazole and atenolol before  Patient reported that during her recent pregnancy her thyroid hormone levels within normal limits and she was off of the medications  She has been noticing gradually worsening shortness of breath and palpitation and she presented to the emergency room found to be tachycardic with the elevated T3 and T4 with low TSH concerning for hyperthyroidism  Patient was started on methimazole endocrinology evaluated the patient patient was also started on at 4 heart rate control  Patient remained tachycardic so the atenolol dose was increased to 75 milligram and her heart rate was improving  I discussed the discharge plan with the endocrinology recommended that the patient is stable for discharge with outpatient follow-up with the Endocrinology    It appears that the patient has an endocrinologist in [facility] and she will be following with them as an outpatient  Patient remained hemodynamically stable and afebrile recommended to get an eye examination as an outpatient  This was discussed with the patient  Patient denied any visual deficits at the time of discharge  For details refer to the chart      Condition at Discharge: good     Discharge Day Visit / Exam:     Subjective:  Patient seen and examined  Reported that she is feeling better  She wants to go today  Vitals: Blood Pressure: 119/83 (08/01/19 0828)  Pulse: (!) 109 (08/01/19 0828)  Temperature: 98 1 °F (36 7 °C) (07/31/19 2215)  Temp Source: Oral (07/31/19 2215)  Respirations: 20 (07/31/19 2215)  Height: 5' (152 4 cm) (07/31/19 0130)  Weight - Scale: 69 8 kg (153 lb 14 1 oz) (07/31/19 0130)  SpO2: 99 % (07/31/19 2215)  Exam:   Physical Exam   Constitutional: She appears well-developed  No distress  HENT:   Head: Normocephalic and atraumatic  Eyes: Pupils are equal, round, and reactive to light  Neck: No JVD present  Cardiovascular:   Tachycardia   Pulmonary/Chest: Effort normal  No stridor  No respiratory distress  Abdominal: Soft  Bowel sounds are normal  She exhibits no distension  There is no tenderness  Musculoskeletal: Normal range of motion  She exhibits no edema  Neurological: She is alert  No cranial nerve deficit  Skin: Skin is warm  Discussion with Family:  Updated patient    Discharge instructions/Information to patient and family:   See after visit summary for information provided to patient and family  Provisions for Follow-Up Care:  See after visit summary for information related to follow-up care and any pertinent home health orders  Planned Readmission:  none     Discharge Statement:  I spent  45  minutes discharging the patient  This time was spent on the day of discharge  I had direct contact with the patient on the day of discharge   Greater than 50% of the total time was spent examining patient, answering all patient questions, arranging and discussing plan of care with patient as well as directly providing post-discharge instructions  Additional time then spent on discharge activities  Discharge Medications:  See after visit summary for reconciled discharge medications provided to patient and family        ** Please Note: This note has been constructed using a voice recognition system **